# Patient Record
Sex: MALE | Race: WHITE | NOT HISPANIC OR LATINO | URBAN - METROPOLITAN AREA
[De-identification: names, ages, dates, MRNs, and addresses within clinical notes are randomized per-mention and may not be internally consistent; named-entity substitution may affect disease eponyms.]

---

## 2017-01-13 ENCOUNTER — EMERGENCY (EMERGENCY)
Facility: HOSPITAL | Age: 62
LOS: 1 days | Discharge: HOME | End: 2017-01-13
Attending: EMERGENCY MEDICINE | Admitting: EMERGENCY MEDICINE
Payer: COMMERCIAL

## 2017-01-13 VITALS
HEART RATE: 98 BPM | SYSTOLIC BLOOD PRESSURE: 165 MMHG | DIASTOLIC BLOOD PRESSURE: 93 MMHG | OXYGEN SATURATION: 98 % | RESPIRATION RATE: 18 BRPM | TEMPERATURE: 98 F

## 2017-01-13 DIAGNOSIS — S06.0X0A CONCUSSION WITHOUT LOSS OF CONSCIOUSNESS, INITIAL ENCOUNTER: ICD-10-CM

## 2017-01-13 DIAGNOSIS — S09.90XA UNSPECIFIED INJURY OF HEAD, INITIAL ENCOUNTER: ICD-10-CM
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
## 2017-01-13 LAB
ALBUMIN SERPL ELPH-MCNC: 4 G/DL — SIGNIFICANT CHANGE UP (ref 3.3–5)
ALP SERPL-CCNC: 109 U/L — SIGNIFICANT CHANGE UP (ref 40–120)
ALT FLD-CCNC: 30 U/L — SIGNIFICANT CHANGE UP (ref 12–78)
ANION GAP SERPL CALC-SCNC: 9 MMOL/L — SIGNIFICANT CHANGE UP (ref 5–17)
APTT BLD: 35.1 SEC — SIGNIFICANT CHANGE UP (ref 27.5–37.4)
AST SERPL-CCNC: 22 U/L — SIGNIFICANT CHANGE UP (ref 15–37)
BASOPHILS # BLD AUTO: 0.1 K/UL — SIGNIFICANT CHANGE UP (ref 0–0.2)
BASOPHILS NFR BLD AUTO: 1.1 % — SIGNIFICANT CHANGE UP (ref 0–2)
BILIRUB SERPL-MCNC: 0.3 MG/DL — SIGNIFICANT CHANGE UP (ref 0.2–1.2)
BLD GP AB SCN SERPL QL: SIGNIFICANT CHANGE UP
BUN SERPL-MCNC: 13 MG/DL — SIGNIFICANT CHANGE UP (ref 7–23)
CALCIUM SERPL-MCNC: 9 MG/DL — SIGNIFICANT CHANGE UP (ref 8.5–10.1)
CHLORIDE SERPL-SCNC: 106 MMOL/L — SIGNIFICANT CHANGE UP (ref 96–108)
CO2 SERPL-SCNC: 26 MMOL/L — SIGNIFICANT CHANGE UP (ref 22–31)
CREAT SERPL-MCNC: 0.97 MG/DL — SIGNIFICANT CHANGE UP (ref 0.5–1.3)
EOSINOPHIL # BLD AUTO: 0.2 K/UL — SIGNIFICANT CHANGE UP (ref 0–0.5)
EOSINOPHIL NFR BLD AUTO: 2.2 % — SIGNIFICANT CHANGE UP (ref 0–6)
GLUCOSE SERPL-MCNC: 95 MG/DL — SIGNIFICANT CHANGE UP (ref 70–99)
HCT VFR BLD CALC: 44.9 % — SIGNIFICANT CHANGE UP (ref 39–50)
HGB BLD-MCNC: 14.8 G/DL — SIGNIFICANT CHANGE UP (ref 13–17)
INR BLD: 1.03 RATIO — SIGNIFICANT CHANGE UP (ref 0.88–1.16)
LYMPHOCYTES # BLD AUTO: 38.9 % — SIGNIFICANT CHANGE UP (ref 13–44)
LYMPHOCYTES # BLD AUTO: 4.1 K/UL — HIGH (ref 1–3.3)
MCHC RBC-ENTMCNC: 29.8 PG — SIGNIFICANT CHANGE UP (ref 27–34)
MCHC RBC-ENTMCNC: 32.8 GM/DL — SIGNIFICANT CHANGE UP (ref 32–36)
MCV RBC AUTO: 90.9 FL — SIGNIFICANT CHANGE UP (ref 80–100)
MONOCYTES # BLD AUTO: 1 K/UL — HIGH (ref 0–0.9)
MONOCYTES NFR BLD AUTO: 9.9 % — HIGH (ref 1–9)
NEUTROPHILS # BLD AUTO: 5 K/UL — SIGNIFICANT CHANGE UP (ref 1.8–7.4)
NEUTROPHILS NFR BLD AUTO: 47.9 % — SIGNIFICANT CHANGE UP (ref 43–77)
PLATELET # BLD AUTO: 332 K/UL — SIGNIFICANT CHANGE UP (ref 150–400)
POTASSIUM SERPL-MCNC: 3.9 MMOL/L — SIGNIFICANT CHANGE UP (ref 3.5–5.3)
POTASSIUM SERPL-SCNC: 3.9 MMOL/L — SIGNIFICANT CHANGE UP (ref 3.5–5.3)
PROT SERPL-MCNC: 8.4 G/DL — HIGH (ref 6–8.3)
PROTHROM AB SERPL-ACNC: 11.4 SEC — SIGNIFICANT CHANGE UP (ref 10–13.1)
RBC # BLD: 4.94 M/UL — SIGNIFICANT CHANGE UP (ref 4.2–5.8)
RBC # FLD: 12.1 % — SIGNIFICANT CHANGE UP (ref 10.3–14.5)
SODIUM SERPL-SCNC: 141 MMOL/L — SIGNIFICANT CHANGE UP (ref 135–145)
WBC # BLD: 10.5 K/UL — SIGNIFICANT CHANGE UP (ref 3.8–10.5)
WBC # FLD AUTO: 10.5 K/UL — SIGNIFICANT CHANGE UP (ref 3.8–10.5)

## 2017-01-13 PROCEDURE — 71260 CT THORAX DX C+: CPT | Mod: 26

## 2017-01-13 PROCEDURE — 99285 EMERGENCY DEPT VISIT HI MDM: CPT

## 2017-01-13 PROCEDURE — 72125 CT NECK SPINE W/O DYE: CPT | Mod: 26

## 2017-01-13 PROCEDURE — 70450 CT HEAD/BRAIN W/O DYE: CPT | Mod: 26

## 2017-01-13 PROCEDURE — 74177 CT ABD & PELVIS W/CONTRAST: CPT | Mod: 26

## 2017-01-13 RX ORDER — SODIUM CHLORIDE 9 MG/ML
1000 INJECTION INTRAMUSCULAR; INTRAVENOUS; SUBCUTANEOUS ONCE
Qty: 0 | Refills: 0 | Status: COMPLETED | OUTPATIENT
Start: 2017-01-13 | End: 2017-01-13

## 2017-01-13 RX ORDER — KETOROLAC TROMETHAMINE 30 MG/ML
30 SYRINGE (ML) INJECTION ONCE
Qty: 0 | Refills: 0 | Status: DISCONTINUED | OUTPATIENT
Start: 2017-01-13 | End: 2017-01-13

## 2017-01-13 RX ADMIN — Medication 30 MILLIGRAM(S): at 20:37

## 2017-01-13 RX ADMIN — SODIUM CHLORIDE 1000 MILLILITER(S): 9 INJECTION INTRAMUSCULAR; INTRAVENOUS; SUBCUTANEOUS at 20:37

## 2017-01-13 RX ADMIN — Medication 30 MILLIGRAM(S): at 21:00

## 2017-01-13 RX ADMIN — SODIUM CHLORIDE 1000 MILLILITER(S): 9 INJECTION INTRAMUSCULAR; INTRAVENOUS; SUBCUTANEOUS at 22:29

## 2017-01-13 NOTE — ED PROVIDER NOTE - ENMT, MLM
Airway patent. Mouth with normal mucosa. Throat has no vesicles, no oropharyngeal exudates and uvula is midline.

## 2017-01-13 NOTE — ED ADULT NURSE REASSESSMENT NOTE - NS ED NURSE REASSESS COMMENT FT1
Pt given Toradol 30 mg ivp for c/o headache and soreness t his right buttock/hi area. many family members at bedside.
Pt able to ambulate to br w/o diff or c/o. Pt states his headache is better and the vertigo is almost completely gone
Pt still c/o being slightly dizzy and still having a headache. Pt given percocet 5/325 po for comfort. Family remain at bedside.
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
## 2017-01-13 NOTE — ED PROVIDER NOTE - PROGRESS NOTE DETAILS
pt reeval, c/o dizziness when sat up. c/o ache to left upper back and right buttock. The patient is up, standing, ambulating and feels improved. He is ready to go Home.

## 2017-01-14 VITALS
OXYGEN SATURATION: 99 % | TEMPERATURE: 98 F | SYSTOLIC BLOOD PRESSURE: 138 MMHG | HEART RATE: 74 BPM | DIASTOLIC BLOOD PRESSURE: 76 MMHG | RESPIRATION RATE: 18 BRPM

## 2017-01-14 PROCEDURE — 86901 BLOOD TYPING SEROLOGIC RH(D): CPT

## 2017-01-14 PROCEDURE — 72125 CT NECK SPINE W/O DYE: CPT

## 2017-01-14 PROCEDURE — 86850 RBC ANTIBODY SCREEN: CPT

## 2017-01-14 PROCEDURE — 74177 CT ABD & PELVIS W/CONTRAST: CPT

## 2017-01-14 PROCEDURE — 96374 THER/PROPH/DIAG INJ IV PUSH: CPT

## 2017-01-14 PROCEDURE — 85730 THROMBOPLASTIN TIME PARTIAL: CPT

## 2017-01-14 PROCEDURE — 86900 BLOOD TYPING SEROLOGIC ABO: CPT

## 2017-01-14 PROCEDURE — 70450 CT HEAD/BRAIN W/O DYE: CPT

## 2017-01-14 PROCEDURE — 99284 EMERGENCY DEPT VISIT MOD MDM: CPT | Mod: 25

## 2017-01-14 PROCEDURE — 71260 CT THORAX DX C+: CPT

## 2017-01-14 PROCEDURE — 80053 COMPREHEN METABOLIC PANEL: CPT

## 2017-01-14 PROCEDURE — 85610 PROTHROMBIN TIME: CPT

## 2017-01-14 PROCEDURE — 85027 COMPLETE CBC AUTOMATED: CPT

## 2017-01-19 ENCOUNTER — APPOINTMENT (OUTPATIENT)
Dept: PHYSICAL MEDICINE AND REHAB | Facility: CLINIC | Age: 62
End: 2017-01-19

## 2017-01-19 VITALS
HEIGHT: 69 IN | SYSTOLIC BLOOD PRESSURE: 137 MMHG | HEART RATE: 91 BPM | BODY MASS INDEX: 31.84 KG/M2 | WEIGHT: 215 LBS | OXYGEN SATURATION: 98 % | RESPIRATION RATE: 16 BRPM | DIASTOLIC BLOOD PRESSURE: 89 MMHG

## 2017-01-19 DIAGNOSIS — Z87.891 PERSONAL HISTORY OF NICOTINE DEPENDENCE: ICD-10-CM

## 2017-01-19 DIAGNOSIS — R42 DIZZINESS AND GIDDINESS: ICD-10-CM

## 2017-01-19 DIAGNOSIS — Z78.9 OTHER SPECIFIED HEALTH STATUS: ICD-10-CM

## 2017-01-19 DIAGNOSIS — Z82.49 FAMILY HISTORY OF ISCHEMIC HEART DISEASE AND OTHER DISEASES OF THE CIRCULATORY SYSTEM: ICD-10-CM

## 2017-01-19 DIAGNOSIS — E78.00 PURE HYPERCHOLESTEROLEMIA, UNSPECIFIED: ICD-10-CM

## 2017-01-19 RX ORDER — CLARITHROMYCIN 500 MG/1
500 TABLET, FILM COATED ORAL
Qty: 20 | Refills: 0 | Status: DISCONTINUED | COMMUNITY
Start: 2016-10-17

## 2017-01-19 RX ORDER — OXYCODONE AND ACETAMINOPHEN 5; 325 MG/1; MG/1
5-325 TABLET ORAL
Qty: 20 | Refills: 0 | Status: DISCONTINUED | COMMUNITY
Start: 2017-01-14

## 2017-02-03 ENCOUNTER — APPOINTMENT (OUTPATIENT)
Dept: PHYSICAL MEDICINE AND REHAB | Facility: CLINIC | Age: 62
End: 2017-02-03

## 2017-02-03 VITALS
HEART RATE: 103 BPM | BODY MASS INDEX: 31.84 KG/M2 | SYSTOLIC BLOOD PRESSURE: 131 MMHG | DIASTOLIC BLOOD PRESSURE: 81 MMHG | WEIGHT: 215 LBS | OXYGEN SATURATION: 97 % | HEIGHT: 69 IN | RESPIRATION RATE: 16 BRPM

## 2017-02-24 ENCOUNTER — APPOINTMENT (OUTPATIENT)
Dept: PHYSICAL MEDICINE AND REHAB | Facility: CLINIC | Age: 62
End: 2017-02-24

## 2017-02-24 VITALS
DIASTOLIC BLOOD PRESSURE: 81 MMHG | HEART RATE: 98 BPM | RESPIRATION RATE: 16 BRPM | OXYGEN SATURATION: 97 % | SYSTOLIC BLOOD PRESSURE: 130 MMHG | WEIGHT: 215 LBS | BODY MASS INDEX: 31.84 KG/M2 | HEIGHT: 69 IN

## 2017-02-24 DIAGNOSIS — T14.8 OTHER INJURY OF UNSPECIFIED BODY REGION: ICD-10-CM

## 2017-02-24 DIAGNOSIS — S06.0X0A CONCUSSION W/OUT LOSS OF CONSCIOUSNESS, INITIAL ENCOUNTER: ICD-10-CM

## 2017-02-24 DIAGNOSIS — M70.61 TROCHANTERIC BURSITIS, RIGHT HIP: ICD-10-CM

## 2017-02-25 PROBLEM — T14.8: Status: ACTIVE | Noted: 2017-01-20

## 2017-02-25 PROBLEM — M70.61 GREATER TROCHANTERIC BURSITIS OF RIGHT HIP: Status: ACTIVE | Noted: 2017-01-19

## 2017-04-24 ENCOUNTER — APPOINTMENT (OUTPATIENT)
Dept: PHYSICAL MEDICINE AND REHAB | Facility: CLINIC | Age: 62
End: 2017-04-24

## 2018-01-10 ENCOUNTER — EMERGENCY (EMERGENCY)
Facility: HOSPITAL | Age: 63
LOS: 1 days | Discharge: HOME | End: 2018-01-10
Attending: EMERGENCY MEDICINE | Admitting: EMERGENCY MEDICINE
Payer: COMMERCIAL

## 2018-01-10 VITALS
HEIGHT: 69 IN | RESPIRATION RATE: 17 BRPM | DIASTOLIC BLOOD PRESSURE: 68 MMHG | OXYGEN SATURATION: 98 % | HEART RATE: 98 BPM | WEIGHT: 214.95 LBS | SYSTOLIC BLOOD PRESSURE: 110 MMHG | TEMPERATURE: 98 F

## 2018-01-10 DIAGNOSIS — S01.81XA LACERATION WITHOUT FOREIGN BODY OF OTHER PART OF HEAD, INITIAL ENCOUNTER: ICD-10-CM

## 2018-01-10 PROCEDURE — 99283 EMERGENCY DEPT VISIT LOW MDM: CPT | Mod: 25

## 2018-01-10 PROCEDURE — 12052 INTMD RPR FACE/MM 2.6-5.0 CM: CPT

## 2018-01-10 PROCEDURE — 99285 EMERGENCY DEPT VISIT HI MDM: CPT | Mod: 25

## 2018-01-10 PROCEDURE — 12052 INTMD RPR FACE/MM 2.6-5.0 CM: CPT | Mod: 54

## 2018-01-10 NOTE — PROCEDURE NOTE - PROCEDURE
Laceration repair of face  01/10/2018  Intermediate Repair of Facial Laceration 2.8cm  Active  [id number] <<-----Click on this checkbox to enter Procedure

## 2018-01-10 NOTE — ED ADULT NURSE NOTE - OBJECTIVE STATEMENT
Received the patient in the Er. Patient is alert and oriented. Skin warm and dry. Sustained a laceration to forehead. Patient is sent by plastic surgeon.

## 2018-01-10 NOTE — ED PROVIDER NOTE - OBJECTIVE STATEMENT
61 yo male c/o vertical laceration over right side of forehead s/p having his bathroom door hit his head, no LOC, tetanus up to date, here to see Plastics Dr. Tavares from  plastic surgery. 63 yo male c/o vertical laceration over right side of forehead s/p walking into his bathroom door after showering this morning hit head against door, no LOC, tetanus up to date, here to see Plastics Dr. Tavares from  plastic surgery.

## 2018-01-10 NOTE — CONSULT NOTE ADULT - SUBJECTIVE AND OBJECTIVE BOX
History of Present Illness:  This is the case of a 63 y/o M patient without significant past medical or surgical history. He sustained trauma to his forehead this morning when he accidentally hit the corner of his bathroom door at home. He says he waited to come to ED because he was busy at work. He temporarily contained the bleeding with compression bandages. He denied any other associated symptoms. He was seen by our ED staff and plastic surgery consult was requested for evaluation and management of his facial laceration. I came in to evaluate and treat him.    Review of Systems:  Unremarkable    Medications:  None    Allergies:  NKDA    Physical Exam:  2.8cm long and vertically oriented laceration over the right forehead, just above the right medial eyebrow Laceration extends down to subcutaneous tissues level. There is visible subcutaneous fat at the bottom of the wound.    Assessment and Plan:  63 y/o M patient with a 2.8cm forehead laceration after hitting his head against his bathroom door this morning. Laceration extends down to subcutaneous tissues. Plan is to perform laceration repair. This will be dictated separately. Patient was instructed to allow the steri strips to be placed on top of the sutures to fall off on their own. He can apply Ice or take Tylenol OTC in case of discomfort. He will follow up with me in my office in 1 week for wound check or sooner than that on an as needed basis. He was turned back over to the emergency room staff for final disposition once procedure was completed.

## 2018-11-27 PROBLEM — Z97.8 PRESENCE OF OTHER SPECIFIED DEVICES: Chronic | Status: ACTIVE | Noted: 2018-01-10

## 2018-11-27 PROBLEM — J93.9 PNEUMOTHORAX, UNSPECIFIED: Chronic | Status: ACTIVE | Noted: 2018-01-10

## 2018-11-27 PROBLEM — S06.0X9A CONCUSSION WITH LOSS OF CONSCIOUSNESS OF UNSPECIFIED DURATION, INITIAL ENCOUNTER: Chronic | Status: ACTIVE | Noted: 2018-01-10

## 2018-11-29 ENCOUNTER — APPOINTMENT (OUTPATIENT)
Dept: CARDIOLOGY | Facility: CLINIC | Age: 63
End: 2018-11-29
Payer: COMMERCIAL

## 2018-11-29 ENCOUNTER — NON-APPOINTMENT (OUTPATIENT)
Age: 63
End: 2018-11-29

## 2018-11-29 VITALS
WEIGHT: 223 LBS | DIASTOLIC BLOOD PRESSURE: 88 MMHG | HEART RATE: 88 BPM | OXYGEN SATURATION: 96 % | BODY MASS INDEX: 33.03 KG/M2 | SYSTOLIC BLOOD PRESSURE: 133 MMHG | HEIGHT: 69 IN

## 2018-11-29 DIAGNOSIS — R94.31 ABNORMAL ELECTROCARDIOGRAM [ECG] [EKG]: ICD-10-CM

## 2018-11-29 PROCEDURE — 99244 OFF/OP CNSLTJ NEW/EST MOD 40: CPT

## 2018-11-29 PROCEDURE — 93000 ELECTROCARDIOGRAM COMPLETE: CPT

## 2018-11-29 RX ORDER — OMEPRAZOLE 20 MG/1
20 CAPSULE, DELAYED RELEASE ORAL
Qty: 90 | Refills: 0 | Status: DISCONTINUED | COMMUNITY
Start: 2016-11-30 | End: 2018-11-29

## 2018-11-29 RX ORDER — MECLIZINE HYDROCHLORIDE 25 MG/1
25 TABLET ORAL
Qty: 63 | Refills: 0 | Status: DISCONTINUED | COMMUNITY
Start: 2017-01-19 | End: 2018-11-29

## 2018-12-03 NOTE — DISCUSSION/SUMMARY
[With Me] : with me [FreeTextEntry1] : Mr. Burroughs is a 63 year old male with HLD, here for initial evaluation.\par \par He reports no significant cardiac complaints, and remains very active. He does have an atypical chest pain that he attributes to GERD.\par \par His physical exam is notable for a soft systolic murmur, which may be mild mitral regurgitation/prolapse.\par His blood pressure is satisfactory. His EKG is a SR with an IVCD and non-specific T wave abn.\par \par He will have his most recent blood work, including lipid panel sent to our office. He will also have his echocardiogram, carotid Doppler, and cardiac CT sent over. Depending on these results, I will likely send him up for an exercise nuclear stress test, which can happen non-urgently. His father passed away of an MI at age 62.\par \par We discussed the importance of diet, and exercise in detail. I will call him after he see the results of the scans. He knows to call if any issues or concerns.

## 2018-12-03 NOTE — ADDENDUM
[FreeTextEntry1] : I have obtained his recent echocardiogram, and he has normal LV function with mild MR as of 4/2018. His EKG demonstrates a SR without obvious ischemia. He has no significant cardiac complaints and remains very active without issue.\par At current time, there is no cardiac contraindication to proceeding with a hernia repair. Routine cardiac monitoring is recommended. He will have a routine stress test in 2019.
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
## 2018-12-03 NOTE — PHYSICAL EXAM
[General Appearance - Well Developed] : well developed [Normal Appearance] : normal appearance [Well Groomed] : well groomed [General Appearance - Well Nourished] : well nourished [No Deformities] : no deformities [General Appearance - In No Acute Distress] : no acute distress [Normal Conjunctiva] : the conjunctiva exhibited no abnormalities [Eyelids - No Xanthelasma] : the eyelids demonstrated no xanthelasmas [Normal Oral Mucosa] : normal oral mucosa [No Oral Pallor] : no oral pallor [No Oral Cyanosis] : no oral cyanosis [Normal Jugular Venous A Waves Present] : normal jugular venous A waves present [Normal Jugular Venous V Waves Present] : normal jugular venous V waves present [No Jugular Venous Sparks A Waves] : no jugular venous sparks A waves [Normal Rate] : normal [Normal S1] : normal S1 [Normal S2] : normal S2 [I] : a grade 1 [II] : a grade 2 [2+] : left 2+ [No Abnormalities] : the abdominal aorta was not enlarged and no bruit was heard [No Pitting Edema] : no pitting edema present [Respiration, Rhythm And Depth] : normal respiratory rhythm and effort [Exaggerated Use Of Accessory Muscles For Inspiration] : no accessory muscle use [Auscultation Breath Sounds / Voice Sounds] : lungs were clear to auscultation bilaterally [Abdomen Soft] : soft [Abdomen Tenderness] : non-tender [Abdomen Mass (___ Cm)] : no abdominal mass palpated [Abnormal Walk] : normal gait [Gait - Sufficient For Exercise Testing] : the gait was sufficient for exercise testing [Nail Clubbing] : no clubbing of the fingernails [Cyanosis, Localized] : no localized cyanosis [Petechial Hemorrhages (___cm)] : no petechial hemorrhages [Skin Color & Pigmentation] : normal skin color and pigmentation [] : no rash [No Venous Stasis] : no venous stasis [Skin Lesions] : no skin lesions [No Skin Ulcers] : no skin ulcer [No Xanthoma] : no  xanthoma was observed [Oriented To Time, Place, And Person] : oriented to person, place, and time [Affect] : the affect was normal [Mood] : the mood was normal [No Anxiety] : not feeling anxious [S3] : no S3 [S4] : no S4 [Right Carotid Bruit] : no bruit heard over the right carotid [Left Carotid Bruit] : no bruit heard over the left carotid [Right Femoral Bruit] : no bruit heard over the right femoral artery [Left Femoral Bruit] : no bruit heard over the left femoral artery

## 2019-05-08 NOTE — CONSULT NOTE ADULT - ASSESSMENT
May 16, 2019        Eduardo Mon  [address]  [facility] [address]      Dear Eduardo Mon,    We have tried to contact you by phone but have not been successful. Voice mailbox is full so we could not leave a message with results and Follow up instructions. A letter was sent requesting you to call us for lab results but we have not  [place] back from you.    The following lab values have been returned and is as follows:                                                                                                Triglycerides:  NORMAL  Goal:  Under 200                    TRIGLYCERIDE (mg/dL)   Date Value   05/07/2019 49       Cholesterol (Total):  NORMAL        Goal:  Under 200                                                                              CHOLESTEROL (mg/dL)   Date Value   05/07/2019 175                                 HDL (good):  NORMAL                 Goal:  Over 40        HDL (mg/dL)   Date Value   05/07/2019 70           LDL (bad):  NORMAL      Goal:  Under 130    LDL (mg/dl)  Date                             Value          5/3/2019                        95        Blood Sugar:  NORMAL                Goal:  Under 99    Glucose mg/dl  Date                                  Value  5/3/2019                             91    Eduardo Mon     Page 2      The following test results have returned normal:     Kidney except for BUN, Potassium, Urinalysis, Liver except for total bilirubin level and PSA (Prostate)      Comments on Results:  Total bilirubin and BUN was elevated      Instructions:  Per Dr. Chandra your liver function lab needs to be repeated in 1-2 weeks   non-fasting, after a good portioned meal and fully hydrated. Order has been placed.please call to schedule appointment here at the clinic or you can go to any ACL lab site closer to home.      Sincerely,        Nolberto Chandra MD/Laurie KAPLAN, LPN  [address]  [address]  [facility] [address]  [phone]         As detailed above

## 2021-04-15 ENCOUNTER — NON-APPOINTMENT (OUTPATIENT)
Age: 66
End: 2021-04-15

## 2021-04-15 ENCOUNTER — APPOINTMENT (OUTPATIENT)
Dept: CARDIOLOGY | Facility: CLINIC | Age: 66
End: 2021-04-15
Payer: COMMERCIAL

## 2021-04-15 VITALS
OXYGEN SATURATION: 98 % | WEIGHT: 219 LBS | DIASTOLIC BLOOD PRESSURE: 87 MMHG | HEART RATE: 84 BPM | HEIGHT: 69 IN | SYSTOLIC BLOOD PRESSURE: 157 MMHG | BODY MASS INDEX: 32.44 KG/M2

## 2021-04-15 PROCEDURE — 93000 ELECTROCARDIOGRAM COMPLETE: CPT

## 2021-04-15 PROCEDURE — 99072 ADDL SUPL MATRL&STAF TM PHE: CPT

## 2021-04-15 PROCEDURE — 99214 OFFICE O/P EST MOD 30 MIN: CPT

## 2021-04-15 RX ORDER — RANITIDINE 150 MG/1
150 TABLET ORAL
Qty: 90 | Refills: 3 | Status: DISCONTINUED | COMMUNITY
Start: 2018-11-29 | End: 2021-04-15

## 2021-04-15 RX ORDER — FAMOTIDINE 20 MG/1
20 TABLET, FILM COATED ORAL
Refills: 0 | Status: ACTIVE | COMMUNITY

## 2021-04-15 NOTE — DISCUSSION/SUMMARY
[With Me] : with me [FreeTextEntry1] : Mr. Burroughs is a 65 year old male with HLD, here for follow up.\par \par He reports some dyspnea on exertion and intermittent chest pain. His EKG is a SR with an IVCD and non-specific ST and T wave abnormality.\par \par In the setting of his symptoms, and abnormal EKG, he will be set up for exercise nuclear stress test in our office.  If he is unable to exercise on a treadmill because of orthopedic issues, the test will need to be pharmacologic.\par \par We discussed the importance of diet, and exercise in detail. He will remain on ASA. We will speak after the test and arrange follow up.
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
## 2021-04-15 NOTE — PHYSICAL EXAM
[General Appearance - Well Developed] : well developed [Normal Appearance] : normal appearance [Well Groomed] : well groomed [General Appearance - Well Nourished] : well nourished [No Deformities] : no deformities [General Appearance - In No Acute Distress] : no acute distress [Normal Conjunctiva] : the conjunctiva exhibited no abnormalities [Eyelids - No Xanthelasma] : the eyelids demonstrated no xanthelasmas [Normal Oral Mucosa] : normal oral mucosa [No Oral Pallor] : no oral pallor [No Oral Cyanosis] : no oral cyanosis [Normal Jugular Venous A Waves Present] : normal jugular venous A waves present [Normal Jugular Venous V Waves Present] : normal jugular venous V waves present [No Jugular Venous Sparks A Waves] : no jugular venous sparks A waves [Respiration, Rhythm And Depth] : normal respiratory rhythm and effort [Exaggerated Use Of Accessory Muscles For Inspiration] : no accessory muscle use [Auscultation Breath Sounds / Voice Sounds] : lungs were clear to auscultation bilaterally [Abdomen Soft] : soft [Abdomen Tenderness] : non-tender [Abdomen Mass (___ Cm)] : no abdominal mass palpated [Abnormal Walk] : normal gait [Gait - Sufficient For Exercise Testing] : the gait was sufficient for exercise testing [Nail Clubbing] : no clubbing of the fingernails [Cyanosis, Localized] : no localized cyanosis [Petechial Hemorrhages (___cm)] : no petechial hemorrhages [Skin Color & Pigmentation] : normal skin color and pigmentation [] : no rash [No Venous Stasis] : no venous stasis [Skin Lesions] : no skin lesions [No Skin Ulcers] : no skin ulcer [No Xanthoma] : no  xanthoma was observed [Oriented To Time, Place, And Person] : oriented to person, place, and time [Affect] : the affect was normal [Mood] : the mood was normal [No Anxiety] : not feeling anxious [Normal Rate] : normal [Normal S1] : normal S1 [Normal S2] : normal S2 [S3] : no S3 [S4] : no S4 [I] : a grade 1 [II] : a grade 2 [Right Carotid Bruit] : no bruit heard over the right carotid [Left Carotid Bruit] : no bruit heard over the left carotid [Right Femoral Bruit] : no bruit heard over the right femoral artery [Left Femoral Bruit] : no bruit heard over the left femoral artery [2+] : left 2+ [No Abnormalities] : the abdominal aorta was not enlarged and no bruit was heard [No Pitting Edema] : no pitting edema present

## 2021-04-23 ENCOUNTER — NON-APPOINTMENT (OUTPATIENT)
Age: 66
End: 2021-04-23

## 2021-04-26 ENCOUNTER — APPOINTMENT (OUTPATIENT)
Dept: CARDIOLOGY | Facility: CLINIC | Age: 66
End: 2021-04-26
Payer: COMMERCIAL

## 2021-04-26 PROCEDURE — 78452 HT MUSCLE IMAGE SPECT MULT: CPT

## 2021-04-26 PROCEDURE — 99072 ADDL SUPL MATRL&STAF TM PHE: CPT

## 2021-04-26 PROCEDURE — A9500: CPT

## 2021-04-26 PROCEDURE — 93015 CV STRESS TEST SUPVJ I&R: CPT

## 2021-05-03 ENCOUNTER — APPOINTMENT (OUTPATIENT)
Dept: CARDIOLOGY | Facility: CLINIC | Age: 66
End: 2021-05-03
Payer: COMMERCIAL

## 2021-05-03 ENCOUNTER — FORM ENCOUNTER (OUTPATIENT)
Age: 66
End: 2021-05-03

## 2021-05-03 PROCEDURE — 99213 OFFICE O/P EST LOW 20 MIN: CPT | Mod: 95

## 2021-05-03 NOTE — DISCUSSION/SUMMARY
[With Me] : with me [FreeTextEntry1] : Mr. Burroughs is a 65 year old male with HLD, here for follow up.\par \par He recently had reported both chest pain and some dyspnea on exertion.  A pharmacologic stress test demonstrated severe inferior and lateral wall defects, with some reversibility, and a decline in LV function. Given this finding, and his symptoms, he will be set up for a cardiac catheterization.  He has the possibility of a contrast reaction, and will be pretreated accordingly.\par We discussed the importance of diet, and exercise in detail. He will remain on ASA. \par We will speak after the procedure, and arrange follow-up.
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
## 2021-05-03 NOTE — PHYSICAL EXAM
[Normal] : well developed, well nourished, no acute distress [de-identified] : telehealth visit so exam deferred

## 2021-05-06 ENCOUNTER — NON-APPOINTMENT (OUTPATIENT)
Age: 66
End: 2021-05-06

## 2021-05-06 ENCOUNTER — APPOINTMENT (OUTPATIENT)
Dept: INTERNAL MEDICINE | Facility: CLINIC | Age: 66
End: 2021-05-06
Payer: COMMERCIAL

## 2021-05-06 VITALS
HEIGHT: 69 IN | OXYGEN SATURATION: 96 % | SYSTOLIC BLOOD PRESSURE: 126 MMHG | DIASTOLIC BLOOD PRESSURE: 74 MMHG | RESPIRATION RATE: 12 BRPM | TEMPERATURE: 99.9 F | WEIGHT: 209 LBS | HEART RATE: 110 BPM | BODY MASS INDEX: 30.96 KG/M2

## 2021-05-06 DIAGNOSIS — J06.9 ACUTE UPPER RESPIRATORY INFECTION, UNSPECIFIED: ICD-10-CM

## 2021-05-06 LAB — S PYO AG SPEC QL IA: NEGATIVE

## 2021-05-06 PROCEDURE — 87880 STREP A ASSAY W/OPTIC: CPT | Mod: QW

## 2021-05-06 PROCEDURE — 99214 OFFICE O/P EST MOD 30 MIN: CPT | Mod: 25

## 2021-05-06 PROCEDURE — 99072 ADDL SUPL MATRL&STAF TM PHE: CPT

## 2021-05-07 ENCOUNTER — APPOINTMENT (OUTPATIENT)
Dept: DISASTER EMERGENCY | Facility: CLINIC | Age: 66
End: 2021-05-07

## 2021-05-08 LAB — SARS-COV-2 N GENE NPH QL NAA+PROBE: NOT DETECTED

## 2021-05-12 ENCOUNTER — OUTPATIENT (OUTPATIENT)
Dept: OUTPATIENT SERVICES | Facility: HOSPITAL | Age: 66
LOS: 1 days | End: 2021-05-12
Payer: COMMERCIAL

## 2021-05-12 DIAGNOSIS — Z11.52 ENCOUNTER FOR SCREENING FOR COVID-19: ICD-10-CM

## 2021-05-12 LAB — SARS-COV-2 RNA SPEC QL NAA+PROBE: SIGNIFICANT CHANGE UP

## 2021-05-12 PROCEDURE — U0005: CPT

## 2021-05-12 PROCEDURE — U0003: CPT

## 2021-05-12 PROCEDURE — C9803: CPT

## 2021-05-13 ENCOUNTER — OUTPATIENT (OUTPATIENT)
Dept: OUTPATIENT SERVICES | Facility: HOSPITAL | Age: 66
LOS: 1 days | End: 2021-05-13
Payer: COMMERCIAL

## 2021-05-13 VITALS
DIASTOLIC BLOOD PRESSURE: 77 MMHG | RESPIRATION RATE: 18 BRPM | OXYGEN SATURATION: 97 % | SYSTOLIC BLOOD PRESSURE: 135 MMHG | HEART RATE: 77 BPM

## 2021-05-13 VITALS
TEMPERATURE: 98 F | SYSTOLIC BLOOD PRESSURE: 131 MMHG | DIASTOLIC BLOOD PRESSURE: 75 MMHG | OXYGEN SATURATION: 98 % | WEIGHT: 212.08 LBS | RESPIRATION RATE: 18 BRPM | HEIGHT: 70 IN | HEART RATE: 73 BPM

## 2021-05-13 DIAGNOSIS — R07.9 CHEST PAIN, UNSPECIFIED: ICD-10-CM

## 2021-05-13 LAB
ANION GAP SERPL CALC-SCNC: 14 MMOL/L — SIGNIFICANT CHANGE UP (ref 5–17)
BUN SERPL-MCNC: 13 MG/DL — SIGNIFICANT CHANGE UP (ref 7–23)
CALCIUM SERPL-MCNC: 9.8 MG/DL — SIGNIFICANT CHANGE UP (ref 8.4–10.5)
CHLORIDE SERPL-SCNC: 107 MMOL/L — SIGNIFICANT CHANGE UP (ref 96–108)
CO2 SERPL-SCNC: 19 MMOL/L — LOW (ref 22–31)
CREAT SERPL-MCNC: 0.84 MG/DL — SIGNIFICANT CHANGE UP (ref 0.5–1.3)
GLUCOSE SERPL-MCNC: 88 MG/DL — SIGNIFICANT CHANGE UP (ref 70–99)
HCT VFR BLD CALC: 41.7 % — SIGNIFICANT CHANGE UP (ref 39–50)
HGB BLD-MCNC: 13.8 G/DL — SIGNIFICANT CHANGE UP (ref 13–17)
MCHC RBC-ENTMCNC: 30.1 PG — SIGNIFICANT CHANGE UP (ref 27–34)
MCHC RBC-ENTMCNC: 33.1 GM/DL — SIGNIFICANT CHANGE UP (ref 32–36)
MCV RBC AUTO: 90.8 FL — SIGNIFICANT CHANGE UP (ref 80–100)
NRBC # BLD: 0 /100 WBCS — SIGNIFICANT CHANGE UP (ref 0–0)
PLATELET # BLD AUTO: 317 K/UL — SIGNIFICANT CHANGE UP (ref 150–400)
POTASSIUM SERPL-MCNC: 4.4 MMOL/L — SIGNIFICANT CHANGE UP (ref 3.5–5.3)
POTASSIUM SERPL-SCNC: 4.4 MMOL/L — SIGNIFICANT CHANGE UP (ref 3.5–5.3)
RBC # BLD: 4.59 M/UL — SIGNIFICANT CHANGE UP (ref 4.2–5.8)
RBC # FLD: 12.6 % — SIGNIFICANT CHANGE UP (ref 10.3–14.5)
SODIUM SERPL-SCNC: 140 MMOL/L — SIGNIFICANT CHANGE UP (ref 135–145)
WBC # BLD: 12.47 K/UL — HIGH (ref 3.8–10.5)
WBC # FLD AUTO: 12.47 K/UL — HIGH (ref 3.8–10.5)

## 2021-05-13 PROCEDURE — 99152 MOD SED SAME PHYS/QHP 5/>YRS: CPT

## 2021-05-13 PROCEDURE — C1894: CPT

## 2021-05-13 PROCEDURE — 93454 CORONARY ARTERY ANGIO S&I: CPT | Mod: 26

## 2021-05-13 PROCEDURE — 80048 BASIC METABOLIC PNL TOTAL CA: CPT

## 2021-05-13 PROCEDURE — 93010 ELECTROCARDIOGRAM REPORT: CPT

## 2021-05-13 PROCEDURE — 93005 ELECTROCARDIOGRAM TRACING: CPT

## 2021-05-13 PROCEDURE — 93454 CORONARY ARTERY ANGIO S&I: CPT

## 2021-05-13 PROCEDURE — 85027 COMPLETE CBC AUTOMATED: CPT

## 2021-05-13 PROCEDURE — C1769: CPT

## 2021-05-13 PROCEDURE — C1887: CPT

## 2021-05-13 RX ORDER — ATORVASTATIN CALCIUM 80 MG/1
1 TABLET, FILM COATED ORAL
Qty: 30 | Refills: 0
Start: 2021-05-13 | End: 2021-06-11

## 2021-05-13 NOTE — ASU DISCHARGE PLAN (ADULT/PEDIATRIC) - CARE PROVIDER_API CALL
Familia Tineo)  Cardiovascular Disease; Internal Medicine  [address], [place] [id number]  Phone: [phone]  Fax: [phone]  Follow Up Time:
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
## 2021-05-13 NOTE — H&P CARDIOLOGY - PMH
Chest tube in place    Concussion    GERD (gastroesophageal reflux disease)    HLD (hyperlipidemia)    Pneumothorax

## 2021-05-13 NOTE — ASU DISCHARGE PLAN (ADULT/PEDIATRIC) - ASU DC SPECIAL INSTRUCTIONSFT
Wound Care:   the day AFTER your procedure remove bandage GENTLY, and clean using  mild soap and gentle warm, water stream, pat dry. leave OPEN to air. YOU MAY SHOWER   DO NOT apply lotions, creams, ointments, powder, perfumes to your incision site  DO NOT SOAK your site for 1 week ( no baths, no pools, no tubs, etc...)  Check  your groin and /or wrist daily. A small amount of bruising, and soreness are normal    ACTIVITY: for 24 hours   - DO NOT DRIVE  - DO NOT make any important decisions or sign legal documents   - DO NOT operate heavy machineries   - you may resume sexual activity in 48 hours, unless otherwise instructed by your cardiologist     If your procedure was done through the WRIST: for the NEXT 3DAYS:  - avoid pushing, pulling, with that affected wrist   - avoid repeated movement of that hand and wrist ( e.g: typing, hammering)  - DO NOT LIFT anything more than 5 lbs     If your procedure was done through the GROIN: for the NEXT 5 DAYS  - Limit climbing stairs, DO NOT soak in bathtub or pool  - no strenuous activities, pushing, pulling, straining  - Do not lift anything 10lbs or heavier     MEDICATION:   take your medications as explained ( see discharge paperwork)   If you received a STENT, you will be taking antiplatelet medications to KEEP YOUR STENT OPEN ( e.g: Aspirin, Plavix, Brilinta, Effient, etc).  Take as prescribed DO NOT STOP taking them without consulting with your cardiologist first.     Follow heart healthy diet recommended by your doctor, , if you smoke STOP SMOKING ( may call [phone] for center of tobacco control if you need assistance)     CALL your doctor to make appointment in 2 WEEKS     ***CALL YOUR DOCTOR***  if you experience: fever, chills, body aches, or severe pain, swelling, redness, heat or yellow discharge at incision site  If you experience Bleeding or excruciating pain at the procedural site, swelling ( golf ball size) at your procedural site  If you experience CHEST PAIN  If you experience extremity numbness, tingling, temperature change ( of your procedural site)   If you are unable to reach your doctor, you may contact:   -Cardiology Office at [facility] at [phone] or   - [facility] [phone]  - [facility] [phone]    Follow with Dr Tineo as outpatient for PCI/CABG.  used

## 2021-05-17 ENCOUNTER — OUTPATIENT (OUTPATIENT)
Dept: OUTPATIENT SERVICES | Facility: HOSPITAL | Age: 66
LOS: 1 days | End: 2021-05-17
Payer: COMMERCIAL

## 2021-05-17 DIAGNOSIS — Z11.52 ENCOUNTER FOR SCREENING FOR COVID-19: ICD-10-CM

## 2021-05-17 PROBLEM — E78.5 HYPERLIPIDEMIA, UNSPECIFIED: Chronic | Status: ACTIVE | Noted: 2021-05-13

## 2021-05-17 PROBLEM — K21.9 GASTRO-ESOPHAGEAL REFLUX DISEASE WITHOUT ESOPHAGITIS: Chronic | Status: ACTIVE | Noted: 2021-05-13

## 2021-05-17 LAB — SARS-COV-2 RNA SPEC QL NAA+PROBE: SIGNIFICANT CHANGE UP

## 2021-05-17 PROCEDURE — C9803: CPT

## 2021-05-17 PROCEDURE — U0005: CPT

## 2021-05-17 PROCEDURE — U0003: CPT

## 2021-05-19 ENCOUNTER — OUTPATIENT (OUTPATIENT)
Dept: OUTPATIENT SERVICES | Facility: HOSPITAL | Age: 66
LOS: 1 days | Discharge: HOME | End: 2021-05-19
Payer: COMMERCIAL

## 2021-05-19 VITALS
RESPIRATION RATE: 18 BRPM | SYSTOLIC BLOOD PRESSURE: 125 MMHG | HEART RATE: 80 BPM | DIASTOLIC BLOOD PRESSURE: 80 MMHG | OXYGEN SATURATION: 96 %

## 2021-05-19 VITALS
SYSTOLIC BLOOD PRESSURE: 136 MMHG | HEART RATE: 77 BPM | HEIGHT: 69 IN | RESPIRATION RATE: 16 BRPM | DIASTOLIC BLOOD PRESSURE: 86 MMHG | OXYGEN SATURATION: 97 % | WEIGHT: 207.9 LBS | TEMPERATURE: 98 F

## 2021-05-19 DIAGNOSIS — R94.39 ABNORMAL RESULT OF OTHER CARDIOVASCULAR FUNCTION STUDY: ICD-10-CM

## 2021-05-19 DIAGNOSIS — R07.9 CHEST PAIN, UNSPECIFIED: ICD-10-CM

## 2021-05-19 LAB
ANION GAP SERPL CALC-SCNC: 14 MMOL/L — SIGNIFICANT CHANGE UP (ref 5–17)
BUN SERPL-MCNC: 20 MG/DL — SIGNIFICANT CHANGE UP (ref 7–23)
CALCIUM SERPL-MCNC: 9.3 MG/DL — SIGNIFICANT CHANGE UP (ref 8.4–10.5)
CHLORIDE SERPL-SCNC: 105 MMOL/L — SIGNIFICANT CHANGE UP (ref 96–108)
CO2 SERPL-SCNC: 20 MMOL/L — LOW (ref 22–31)
CREAT SERPL-MCNC: 0.82 MG/DL — SIGNIFICANT CHANGE UP (ref 0.5–1.3)
GLUCOSE SERPL-MCNC: 97 MG/DL — SIGNIFICANT CHANGE UP (ref 70–99)
HCT VFR BLD CALC: 43.1 % — SIGNIFICANT CHANGE UP (ref 39–50)
HGB BLD-MCNC: 14.3 G/DL — SIGNIFICANT CHANGE UP (ref 13–17)
MCHC RBC-ENTMCNC: 30.3 PG — SIGNIFICANT CHANGE UP (ref 27–34)
MCHC RBC-ENTMCNC: 33.2 GM/DL — SIGNIFICANT CHANGE UP (ref 32–36)
MCV RBC AUTO: 91.3 FL — SIGNIFICANT CHANGE UP (ref 80–100)
NRBC # BLD: 0 /100 WBCS — SIGNIFICANT CHANGE UP (ref 0–0)
PLATELET # BLD AUTO: 291 K/UL — SIGNIFICANT CHANGE UP (ref 150–400)
POTASSIUM SERPL-MCNC: 4.3 MMOL/L — SIGNIFICANT CHANGE UP (ref 3.5–5.3)
POTASSIUM SERPL-SCNC: 4.3 MMOL/L — SIGNIFICANT CHANGE UP (ref 3.5–5.3)
RBC # BLD: 4.72 M/UL — SIGNIFICANT CHANGE UP (ref 4.2–5.8)
RBC # FLD: 12.8 % — SIGNIFICANT CHANGE UP (ref 10.3–14.5)
SODIUM SERPL-SCNC: 139 MMOL/L — SIGNIFICANT CHANGE UP (ref 135–145)
WBC # BLD: 9.61 K/UL — SIGNIFICANT CHANGE UP (ref 3.8–10.5)
WBC # FLD AUTO: 9.61 K/UL — SIGNIFICANT CHANGE UP (ref 3.8–10.5)

## 2021-05-19 PROCEDURE — C1769: CPT

## 2021-05-19 PROCEDURE — 93010 ELECTROCARDIOGRAM REPORT: CPT

## 2021-05-19 PROCEDURE — 93005 ELECTROCARDIOGRAM TRACING: CPT

## 2021-05-19 PROCEDURE — 80048 BASIC METABOLIC PNL TOTAL CA: CPT

## 2021-05-19 PROCEDURE — C1753: CPT

## 2021-05-19 PROCEDURE — 92978 ENDOLUMINL IVUS OCT C 1ST: CPT | Mod: LD

## 2021-05-19 PROCEDURE — 85027 COMPLETE CBC AUTOMATED: CPT

## 2021-05-19 PROCEDURE — 93010 ELECTROCARDIOGRAM REPORT: CPT | Mod: 77

## 2021-05-19 PROCEDURE — C1887: CPT

## 2021-05-19 PROCEDURE — 99152 MOD SED SAME PHYS/QHP 5/>YRS: CPT

## 2021-05-19 PROCEDURE — 92978 ENDOLUMINL IVUS OCT C 1ST: CPT | Mod: 26,LD

## 2021-05-19 PROCEDURE — C9600: CPT | Mod: LD

## 2021-05-19 PROCEDURE — C1874: CPT

## 2021-05-19 PROCEDURE — 92928 PRQ TCAT PLMT NTRAC ST 1 LES: CPT | Mod: LD

## 2021-05-19 PROCEDURE — 99153 MOD SED SAME PHYS/QHP EA: CPT

## 2021-05-19 RX ORDER — CLOPIDOGREL BISULFATE 75 MG/1
75 TABLET, FILM COATED ORAL DAILY
Refills: 0 | Status: DISCONTINUED | OUTPATIENT
Start: 2021-05-20 | End: 2021-06-02

## 2021-05-19 RX ORDER — CLOPIDOGREL BISULFATE 75 MG/1
1 TABLET, FILM COATED ORAL
Qty: 90 | Refills: 3
Start: 2021-05-19 | End: 2022-05-13

## 2021-05-19 RX ORDER — SODIUM CHLORIDE 9 MG/ML
3 INJECTION INTRAMUSCULAR; INTRAVENOUS; SUBCUTANEOUS EVERY 8 HOURS
Refills: 0 | Status: DISCONTINUED | OUTPATIENT
Start: 2021-05-19 | End: 2021-06-02

## 2021-05-19 RX ORDER — ASPIRIN/CALCIUM CARB/MAGNESIUM 324 MG
81 TABLET ORAL DAILY
Refills: 0 | Status: DISCONTINUED | OUTPATIENT
Start: 2021-05-20 | End: 2021-06-02

## 2021-05-19 RX ADMIN — SODIUM CHLORIDE 3 MILLILITER(S): 9 INJECTION INTRAMUSCULAR; INTRAVENOUS; SUBCUTANEOUS at 14:00
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
## 2021-05-19 NOTE — PHARMACOTHERAPY INTERVENTION NOTE - COMMENTS
Counseled patient on the following discharge medications, indication, and possible side effects:    Home Medications:  Aspirin Enteric Coated 81 mg oral delayed release tablet: 1 tab(s) orally once a day  Atorvastatin 40mg tab: 1 tab orally once a day  Clopidogrel 75 mg tablet: 1 tab orally once a day      Provided Med Essential Fact Sheet in English. Patient questions and concerns were answered and addressed. Patient demonstrated understanding.    Renny Scott, Pharm D.

## 2021-05-19 NOTE — ASU DISCHARGE PLAN (ADULT/PEDIATRIC) - PROVIDER TOKENS
PROVIDER:[TOKEN:[[id number]]],PROVIDER:[TOKEN:[[id number]]] PROVIDER:[TOKEN:[[id number]]],PROVIDER:[TOKEN:[[id number]]],PROVIDER:[TOKEN:[[id number]]]

## 2021-05-19 NOTE — ASU DISCHARGE PLAN (ADULT/PEDIATRIC) - CARE PROVIDER_API CALL
Familia Tineo)  Cardiovascular Disease; Internal Medicine  [address], [place] [id number]  Phone: [phone]  Fax: [phone]  Follow Up Time:     Vikas Thakkar)  [facility] [place] [address]  Phone: [phone]  Fax: [phone]  Follow Up Time:    Familia Tineo)  Cardiovascular Disease; Internal Medicine  [address] [place], [place] [id number]  Phone: [phone]  Fax: [phone]  Follow Up Time:     Vikas Thakkar)  [place] [address]  Phone: [phone]  Fax: [phone]  Follow Up Time:     Jason Miller)  Cardiology; Internal Medicine; Interventional Cardiology  [facility]- Dept of Cardiology, [address] [place] [address]  Phone: [phone]  Fax: [phone]  Follow Up Time:

## 2021-05-19 NOTE — ASU DISCHARGE PLAN (ADULT/PEDIATRIC) - ASU DC SPECIAL INSTRUCTIONSFT
No heavy lifting for 2 weeks, no strenuous activity  ( pushing/ pulling) no driving for x 2 days,  you may shower 24 hours following procedure but no bathing or swimming for x1  week, no strenuous sex for x 1 week & follow up with your cardiologist in 1-2 week No heavy lifting for 2 weeks, no strenuous activity  ( pushing/ pulling) no driving for x 2 days,  you may shower 24 hours following procedure but no bathing or swimming for x1  week, no strenuous sex for x 1 week & follow up with your cardiologist in 1-2 week    Follow up with Dr. Miller for Staged  of RCA in 4-6 weeks

## 2021-05-27 ENCOUNTER — NON-APPOINTMENT (OUTPATIENT)
Age: 66
End: 2021-05-27

## 2021-05-27 ENCOUNTER — APPOINTMENT (OUTPATIENT)
Dept: INTERNAL MEDICINE | Facility: CLINIC | Age: 66
End: 2021-05-27
Payer: COMMERCIAL

## 2021-05-27 VITALS
DIASTOLIC BLOOD PRESSURE: 74 MMHG | WEIGHT: 208 LBS | BODY MASS INDEX: 30.81 KG/M2 | SYSTOLIC BLOOD PRESSURE: 113 MMHG | OXYGEN SATURATION: 98 % | HEART RATE: 86 BPM | HEIGHT: 69 IN | RESPIRATION RATE: 12 BRPM

## 2021-05-27 VITALS — DIASTOLIC BLOOD PRESSURE: 80 MMHG | SYSTOLIC BLOOD PRESSURE: 120 MMHG

## 2021-05-27 DIAGNOSIS — Z86.79 PERSONAL HISTORY OF OTHER DISEASES OF THE CIRCULATORY SYSTEM: ICD-10-CM

## 2021-05-27 PROCEDURE — 93000 ELECTROCARDIOGRAM COMPLETE: CPT | Mod: 59

## 2021-05-27 PROCEDURE — 99396 PREV VISIT EST AGE 40-64: CPT | Mod: 25

## 2021-05-27 PROCEDURE — 99213 OFFICE O/P EST LOW 20 MIN: CPT | Mod: 25

## 2021-05-27 PROCEDURE — 36415 COLL VENOUS BLD VENIPUNCTURE: CPT

## 2021-05-27 PROCEDURE — G0444 DEPRESSION SCREEN ANNUAL: CPT | Mod: NC

## 2021-05-27 PROCEDURE — 99072 ADDL SUPL MATRL&STAF TM PHE: CPT

## 2021-05-27 PROCEDURE — 99386 PREV VISIT NEW AGE 40-64: CPT | Mod: 25

## 2021-05-27 RX ORDER — ASPIRIN ENTERIC COATED TABLETS 81 MG 81 MG/1
81 TABLET, DELAYED RELEASE ORAL
Refills: 0 | Status: DISCONTINUED | COMMUNITY
End: 2021-05-27

## 2021-05-27 RX ORDER — DIPHENHYDRAMINE HYDROCHLORIDE 50 MG/ML
50 INJECTION, SOLUTION INTRAMUSCULAR; INTRAVENOUS ONCE
Qty: 50 | Refills: 0 | Status: DISCONTINUED | COMMUNITY
Start: 2021-05-03 | End: 2021-05-27

## 2021-05-27 RX ORDER — PREDNISONE 50 MG/1
50 TABLET ORAL
Qty: 3 | Refills: 0 | Status: DISCONTINUED | COMMUNITY
Start: 2021-05-03 | End: 2021-05-27

## 2021-05-27 NOTE — HEALTH RISK ASSESSMENT
[0] : 2) Feeling down, depressed, or hopeless: Not at all (0) [[id number]] : 0 [Patient reported colonoscopy was normal] : Patient reported colonoscopy was normal [ColonoscopyDate] : 2021

## 2021-05-27 NOTE — HISTORY OF PRESENT ILLNESS
[de-identified] : PATIENT RECENTLY DIAGNOSED W/ CAD AFTER ABNORMAL STRESS TEST , S/P STENT X 1 , FEELS WELL, WAS STARTED ON PLAVIX AND LIPITOR .

## 2021-05-27 NOTE — PHYSICAL EXAM
[No Acute Distress] : no acute distress [PERRL] : pupils equal round and reactive to light [EOMI] : extraocular movements intact [No Lymphadenopathy] : no lymphadenopathy [Thyroid Normal, No Nodules] : the thyroid was normal and there were no nodules present [No Carotid Bruits] : no carotid bruits [No Abdominal Bruit] : a ~M bruit was not heard ~T in the abdomen [No Edema] : there was no peripheral edema [Normal Appearance] : normal in appearance [No Masses] : no palpable masses [Prostate Enlargement] : the prostate was not enlarged [Prostate Tenderness] : the prostate was not tender [No Prostate Nodules] : no prostate nodules [Normal] : no posterior cervical lymphadenopathy and no anterior cervical lymphadenopathy [No Focal Deficits] : no focal deficits [Normal Insight/Judgement] : insight and judgment were intact [Stool Occult Blood] : stool negative for occult blood

## 2021-06-01 LAB
ALBUMIN SERPL ELPH-MCNC: 4.2 G/DL
ALP BLD-CCNC: 120 U/L
ALT SERPL-CCNC: 19 U/L
ANION GAP SERPL CALC-SCNC: 13 MMOL/L
APPEARANCE: CLEAR
AST SERPL-CCNC: 18 U/L
BACTERIA: NEGATIVE
BASOPHILS # BLD AUTO: 0.07 K/UL
BASOPHILS NFR BLD AUTO: 1 %
BILIRUB SERPL-MCNC: 0.4 MG/DL
BILIRUBIN URINE: NEGATIVE
BLOOD URINE: NEGATIVE
BUN SERPL-MCNC: 13 MG/DL
CALCIUM SERPL-MCNC: 9.4 MG/DL
CHLORIDE SERPL-SCNC: 104 MMOL/L
CHOLEST SERPL-MCNC: 158 MG/DL
CO2 SERPL-SCNC: 22 MMOL/L
COLOR: YELLOW
CREAT SERPL-MCNC: 0.88 MG/DL
EOSINOPHIL # BLD AUTO: 0.2 K/UL
EOSINOPHIL NFR BLD AUTO: 2.8 %
ESTIMATED AVERAGE GLUCOSE: 117 MG/DL
FERRITIN SERPL-MCNC: 79 NG/ML
GLUCOSE QUALITATIVE U: NEGATIVE
GLUCOSE SERPL-MCNC: 88 MG/DL
HBA1C MFR BLD HPLC: 5.7 %
HCT VFR BLD CALC: 44.6 %
HDLC SERPL-MCNC: 35 MG/DL
HGB BLD-MCNC: 14 G/DL
HYALINE CASTS: 0 /LPF
IMM GRANULOCYTES NFR BLD AUTO: 0.3 %
IRON SATN MFR SERPL: 25 %
IRON SERPL-MCNC: 73 UG/DL
KETONES URINE: NEGATIVE
LDLC SERPL CALC-MCNC: 95 MG/DL
LEUKOCYTE ESTERASE URINE: NEGATIVE
LYMPHOCYTES # BLD AUTO: 1.24 K/UL
LYMPHOCYTES NFR BLD AUTO: 17.5 %
MAN DIFF?: NORMAL
MCHC RBC-ENTMCNC: 30.4 PG
MCHC RBC-ENTMCNC: 31.4 GM/DL
MCV RBC AUTO: 96.7 FL
MICROSCOPIC-UA: NORMAL
MONOCYTES # BLD AUTO: 0.71 K/UL
MONOCYTES NFR BLD AUTO: 10 %
NEUTROPHILS # BLD AUTO: 4.86 K/UL
NEUTROPHILS NFR BLD AUTO: 68.4 %
NITRITE URINE: NEGATIVE
NONHDLC SERPL-MCNC: 123 MG/DL
PH URINE: 7
PLATELET # BLD AUTO: 285 K/UL
POTASSIUM SERPL-SCNC: 4.7 MMOL/L
PROT SERPL-MCNC: 7.1 G/DL
PROTEIN URINE: NORMAL
PSA SERPL-MCNC: 0.64 NG/ML
RBC # BLD: 4.61 M/UL
RBC # FLD: 13.6 %
RED BLOOD CELLS URINE: 2 /HPF
SODIUM SERPL-SCNC: 139 MMOL/L
SPECIFIC GRAVITY URINE: 1.02
SQUAMOUS EPITHELIAL CELLS: 0 /HPF
TIBC SERPL-MCNC: 299 UG/DL
TRIGL SERPL-MCNC: 143 MG/DL
UIBC SERPL-MCNC: 226 UG/DL
UROBILINOGEN URINE: NORMAL
WBC # FLD AUTO: 7.1 K/UL
WHITE BLOOD CELLS URINE: 1 /HPF

## 2021-06-04 RX ORDER — ATORVASTATIN CALCIUM 20 MG/1
20 TABLET, FILM COATED ORAL
Refills: 0 | Status: DISCONTINUED | COMMUNITY
End: 2021-06-04

## 2021-06-16 ENCOUNTER — NON-APPOINTMENT (OUTPATIENT)
Age: 66
End: 2021-06-16

## 2021-06-16 ENCOUNTER — APPOINTMENT (OUTPATIENT)
Dept: CARDIOLOGY | Facility: CLINIC | Age: 66
End: 2021-06-16
Payer: MEDICARE

## 2021-06-16 VITALS
SYSTOLIC BLOOD PRESSURE: 136 MMHG | OXYGEN SATURATION: 98 % | WEIGHT: 213 LBS | HEIGHT: 69 IN | DIASTOLIC BLOOD PRESSURE: 80 MMHG | BODY MASS INDEX: 31.55 KG/M2 | HEART RATE: 72 BPM

## 2021-06-16 PROCEDURE — 93000 ELECTROCARDIOGRAM COMPLETE: CPT

## 2021-06-16 PROCEDURE — 99214 OFFICE O/P EST MOD 30 MIN: CPT

## 2021-06-16 NOTE — PHYSICAL EXAM
[Well Developed] : well developed [Well Nourished] : well nourished [No Acute Distress] : no acute distress [Normal Conjunctiva] : normal conjunctiva [No Carotid Bruit] : no carotid bruit [Normal Venous Pressure] : normal venous pressure [Normal S1, S2] : normal S1, S2 [No Murmur] : no murmur [No Rub] : no rub [No Gallop] : no gallop [Clear Lung Fields] : clear lung fields [Good Air Entry] : good air entry [No Respiratory Distress] : no respiratory distress  [Non Tender] : non-tender [Soft] : abdomen soft [No Masses/organomegaly] : no masses/organomegaly [Normal Bowel Sounds] : normal bowel sounds [Normal Gait] : normal gait [No Edema] : no edema [No Cyanosis] : no cyanosis [No Clubbing] : no clubbing [No Varicosities] : no varicosities [No Rash] : no rash [No Skin Lesions] : no skin lesions [Moves all extremities] : moves all extremities [No Focal Deficits] : no focal deficits [Normal Speech] : normal speech [Alert and Oriented] : alert and oriented [Normal memory] : normal memory

## 2021-06-16 NOTE — DISCUSSION/SUMMARY
[With Me] : with me [FreeTextEntry1] : Mr. Burroughs is a 65 year old male with HLD, here for follow up.\par \par He recently had a cardiac catheterization demonstrating significant LAD and RCA disease.  He is now status post PCI to his LAD, and feels well.  He has no symptoms of chest pain, and his prior symptoms of heartburn have resolved.\par \par He will remain on aspirin, Plavix, and Crestor.  He is already seen an improvement in his LDL to 95.  He remains to have a  of his RCA (and OM1) and we will set him up for intervention on this next month.\par He will try to stay active, eat right, and lose weight.  He knows to call with any issues or concerns.
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
## 2021-06-23 ENCOUNTER — APPOINTMENT (OUTPATIENT)
Dept: INTERNAL MEDICINE | Facility: CLINIC | Age: 66
End: 2021-06-23
Payer: MEDICARE

## 2021-06-23 VITALS
BODY MASS INDEX: 41.23 KG/M2 | HEIGHT: 60 IN | RESPIRATION RATE: 12 BRPM | DIASTOLIC BLOOD PRESSURE: 75 MMHG | OXYGEN SATURATION: 96 % | SYSTOLIC BLOOD PRESSURE: 118 MMHG | TEMPERATURE: 98.1 F | WEIGHT: 210 LBS | HEART RATE: 89 BPM

## 2021-06-23 PROCEDURE — 99213 OFFICE O/P EST LOW 20 MIN: CPT

## 2021-06-23 NOTE — HISTORY OF PRESENT ILLNESS
[FreeTextEntry1] : C/O COUGH  [de-identified] : PATIENT C/O COUGH , SPUTUM X 2 WEEKS , NO FEVER , CHILLS , CHEST PAINS , DYSPNEA

## 2021-06-23 NOTE — PHYSICAL EXAM
[No Acute Distress] : no acute distress [Normal Outer Ear/Nose] : the outer ears and nose were normal in appearance [Normal Oropharynx] : the oropharynx was normal [No Lymphadenopathy] : no lymphadenopathy [Normal] : soft, non-tender, non-distended, no masses palpated, no HSM and normal bowel sounds

## 2021-08-09 ENCOUNTER — APPOINTMENT (OUTPATIENT)
Dept: DISASTER EMERGENCY | Facility: CLINIC | Age: 66
End: 2021-08-09

## 2021-08-10 LAB — SARS-COV-2 N GENE NPH QL NAA+PROBE: NOT DETECTED

## 2021-08-12 ENCOUNTER — INPATIENT (INPATIENT)
Facility: HOSPITAL | Age: 66
LOS: 0 days | Discharge: HOME | DRG: 251 | End: 2021-08-13
Attending: INTERNAL MEDICINE | Admitting: INTERNAL MEDICINE
Payer: COMMERCIAL

## 2021-08-12 VITALS
HEART RATE: 76 BPM | OXYGEN SATURATION: 97 % | HEIGHT: 69 IN | RESPIRATION RATE: 16 BRPM | WEIGHT: 203.93 LBS | TEMPERATURE: 98 F | SYSTOLIC BLOOD PRESSURE: 127 MMHG | DIASTOLIC BLOOD PRESSURE: 76 MMHG

## 2021-08-12 DIAGNOSIS — I25.10 ATHEROSCLEROTIC HEART DISEASE OF NATIVE CORONARY ARTERY WITHOUT ANGINA PECTORIS: ICD-10-CM

## 2021-08-12 DIAGNOSIS — Z95.5 PRESENCE OF CORONARY ANGIOPLASTY IMPLANT AND GRAFT: Chronic | ICD-10-CM

## 2021-08-12 LAB
ANION GAP SERPL CALC-SCNC: 13 MMOL/L — SIGNIFICANT CHANGE UP (ref 5–17)
BLD GP AB SCN SERPL QL: NEGATIVE — SIGNIFICANT CHANGE UP
BUN SERPL-MCNC: 15 MG/DL — SIGNIFICANT CHANGE UP (ref 7–23)
CALCIUM SERPL-MCNC: 9.9 MG/DL — SIGNIFICANT CHANGE UP (ref 8.4–10.5)
CHLORIDE SERPL-SCNC: 105 MMOL/L — SIGNIFICANT CHANGE UP (ref 96–108)
CO2 SERPL-SCNC: 21 MMOL/L — LOW (ref 22–31)
CREAT SERPL-MCNC: 0.82 MG/DL — SIGNIFICANT CHANGE UP (ref 0.5–1.3)
GLUCOSE SERPL-MCNC: 105 MG/DL — HIGH (ref 70–99)
HCT VFR BLD CALC: 44.2 % — SIGNIFICANT CHANGE UP (ref 39–50)
HGB BLD-MCNC: 14.8 G/DL — SIGNIFICANT CHANGE UP (ref 13–17)
MCHC RBC-ENTMCNC: 30.5 PG — SIGNIFICANT CHANGE UP (ref 27–34)
MCHC RBC-ENTMCNC: 33.5 GM/DL — SIGNIFICANT CHANGE UP (ref 32–36)
MCV RBC AUTO: 90.9 FL — SIGNIFICANT CHANGE UP (ref 80–100)
NRBC # BLD: 0 /100 WBCS — SIGNIFICANT CHANGE UP (ref 0–0)
PLATELET # BLD AUTO: 298 K/UL — SIGNIFICANT CHANGE UP (ref 150–400)
POTASSIUM SERPL-MCNC: 4.2 MMOL/L — SIGNIFICANT CHANGE UP (ref 3.5–5.3)
POTASSIUM SERPL-SCNC: 4.2 MMOL/L — SIGNIFICANT CHANGE UP (ref 3.5–5.3)
RBC # BLD: 4.86 M/UL — SIGNIFICANT CHANGE UP (ref 4.2–5.8)
RBC # FLD: 12.6 % — SIGNIFICANT CHANGE UP (ref 10.3–14.5)
RH IG SCN BLD-IMP: POSITIVE — SIGNIFICANT CHANGE UP
SODIUM SERPL-SCNC: 139 MMOL/L — SIGNIFICANT CHANGE UP (ref 135–145)
WBC # BLD: 9.04 K/UL — SIGNIFICANT CHANGE UP (ref 3.8–10.5)
WBC # FLD AUTO: 9.04 K/UL — SIGNIFICANT CHANGE UP (ref 3.8–10.5)

## 2021-08-12 PROCEDURE — 92920 PRQ TRLUML C ANGIOP 1ART&/BR: CPT | Mod: LC

## 2021-08-12 PROCEDURE — 99152 MOD SED SAME PHYS/QHP 5/>YRS: CPT

## 2021-08-12 PROCEDURE — 93010 ELECTROCARDIOGRAM REPORT: CPT | Mod: 76,77

## 2021-08-12 RX ORDER — ROSUVASTATIN CALCIUM 5 MG/1
20 TABLET ORAL AT BEDTIME
Refills: 0 | Status: DISCONTINUED | OUTPATIENT
Start: 2021-08-12 | End: 2021-08-13

## 2021-08-12 RX ORDER — ASPIRIN/CALCIUM CARB/MAGNESIUM 324 MG
81 TABLET ORAL DAILY
Refills: 0 | Status: DISCONTINUED | OUTPATIENT
Start: 2021-08-13 | End: 2021-08-13

## 2021-08-12 RX ORDER — FAMOTIDINE 10 MG/ML
20 INJECTION INTRAVENOUS DAILY
Refills: 0 | Status: DISCONTINUED | OUTPATIENT
Start: 2021-08-12 | End: 2021-08-13

## 2021-08-12 RX ORDER — SODIUM CHLORIDE 9 MG/ML
3 INJECTION INTRAMUSCULAR; INTRAVENOUS; SUBCUTANEOUS EVERY 8 HOURS
Refills: 0 | Status: DISCONTINUED | OUTPATIENT
Start: 2021-08-12 | End: 2021-08-13

## 2021-08-12 RX ORDER — CLOPIDOGREL BISULFATE 75 MG/1
75 TABLET, FILM COATED ORAL DAILY
Refills: 0 | Status: DISCONTINUED | OUTPATIENT
Start: 2021-08-13 | End: 2021-08-13

## 2021-08-12 RX ORDER — ATORVASTATIN CALCIUM 80 MG/1
80 TABLET, FILM COATED ORAL AT BEDTIME
Refills: 0 | Status: DISCONTINUED | OUTPATIENT
Start: 2021-08-12 | End: 2021-08-12

## 2021-08-12 RX ADMIN — ROSUVASTATIN CALCIUM 20 MILLIGRAM(S): 5 TABLET ORAL at 21:58

## 2021-08-12 RX ADMIN — SODIUM CHLORIDE 3 MILLILITER(S): 9 INJECTION INTRAMUSCULAR; INTRAVENOUS; SUBCUTANEOUS at 20:46

## 2021-08-12 NOTE — ASU DISCHARGE PLAN (ADULT/PEDIATRIC) - ASU DC SPECIAL INSTRUCTIONSFT
No heavy lifting, strenuous activity, bending, straining, or unnecessary stair climbing for 2 weeks. No driving for 2 days. You may shower 24 hours following the procedure but avoid baths/swimming for 1 week. Check your GROIN site and RADIAL site  for bleeding and/or swelling daily following procedure and call your doctor immediately if it occurs or if you experience increased pain at the site. Follow up with your cardiologist in 1-2 weeks. You may call [facility] if you have any questions/concerns regarding your procedure [phone].  FOLLOW UP WITH DR. ELLISON IN 4 WEEKS

## 2021-08-12 NOTE — CHART NOTE - NSCHARTNOTEFT_GEN_A_CORE
s/p  LCX intervention  baseline SBP 120s   during recovery pt c/o mild chest pressure and SBP was in 90s  Bedside ECHO was performed to r/o effusion  Normal saline 200cc given (due to decreased LV function)  Current -115   HR  60-70 frequent PVCs       #8 Afghan sheath was removed from right femoral artery manual pressure held with good hemostasis VSS  No bleeding/ hematoma. Pt tolerated well. Disposition pending.  d/w Dr. Reynaga (Interventional fellow)
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
## 2021-08-12 NOTE — ASU DISCHARGE PLAN (ADULT/PEDIATRIC) - CARE PROVIDER_API CALL
Jason Miller)  Cardiology; Internal Medicine; Interventional Cardiology  [facility]- Dept of Cardiology, [address] [place] [address]  Phone: [phone]  Fax: [phone]  Follow Up Time:
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
## 2021-08-13 ENCOUNTER — TRANSCRIPTION ENCOUNTER (OUTPATIENT)
Age: 66
End: 2021-08-13

## 2021-08-13 VITALS
HEART RATE: 90 BPM | RESPIRATION RATE: 17 BRPM | TEMPERATURE: 99 F | OXYGEN SATURATION: 95 % | DIASTOLIC BLOOD PRESSURE: 66 MMHG | SYSTOLIC BLOOD PRESSURE: 120 MMHG

## 2021-08-13 PROCEDURE — 99152 MOD SED SAME PHYS/QHP 5/>YRS: CPT

## 2021-08-13 PROCEDURE — C1725: CPT

## 2021-08-13 PROCEDURE — 92943 PRQ TRLUML REVSC CH OCC ANT: CPT

## 2021-08-13 PROCEDURE — 93321 DOPPLER ECHO F-UP/LMTD STD: CPT

## 2021-08-13 PROCEDURE — 99153 MOD SED SAME PHYS/QHP EA: CPT

## 2021-08-13 PROCEDURE — 86900 BLOOD TYPING SEROLOGIC ABO: CPT

## 2021-08-13 PROCEDURE — C1887: CPT

## 2021-08-13 PROCEDURE — C1769: CPT

## 2021-08-13 PROCEDURE — 86901 BLOOD TYPING SEROLOGIC RH(D): CPT

## 2021-08-13 PROCEDURE — 93458 L HRT ARTERY/VENTRICLE ANGIO: CPT

## 2021-08-13 PROCEDURE — 86850 RBC ANTIBODY SCREEN: CPT

## 2021-08-13 PROCEDURE — 99222 1ST HOSP IP/OBS MODERATE 55: CPT

## 2021-08-13 PROCEDURE — 80048 BASIC METABOLIC PNL TOTAL CA: CPT

## 2021-08-13 PROCEDURE — 93308 TTE F-UP OR LMTD: CPT

## 2021-08-13 PROCEDURE — 85027 COMPLETE CBC AUTOMATED: CPT

## 2021-08-13 PROCEDURE — 93005 ELECTROCARDIOGRAM TRACING: CPT

## 2021-08-13 PROCEDURE — C1894: CPT

## 2021-08-13 PROCEDURE — 92920 PRQ TRLUML C ANGIOP 1ART&/BR: CPT | Mod: LC

## 2021-08-13 RX ORDER — LOSARTAN POTASSIUM 100 MG/1
1 TABLET, FILM COATED ORAL
Qty: 30 | Refills: 0
Start: 2021-08-13 | End: 2021-09-11

## 2021-08-13 RX ORDER — METOPROLOL TARTRATE 50 MG
25 TABLET ORAL DAILY
Refills: 0 | Status: DISCONTINUED | OUTPATIENT
Start: 2021-08-13 | End: 2021-08-13

## 2021-08-13 RX ORDER — METOPROLOL TARTRATE 50 MG
1 TABLET ORAL
Qty: 30 | Refills: 0
Start: 2021-08-13 | End: 2021-09-11

## 2021-08-13 RX ORDER — LOSARTAN POTASSIUM 100 MG/1
25 TABLET, FILM COATED ORAL DAILY
Refills: 0 | Status: DISCONTINUED | OUTPATIENT
Start: 2021-08-13 | End: 2021-08-13

## 2021-08-13 RX ADMIN — FAMOTIDINE 20 MILLIGRAM(S): 10 INJECTION INTRAVENOUS at 05:30

## 2021-08-13 RX ADMIN — CLOPIDOGREL BISULFATE 75 MILLIGRAM(S): 75 TABLET, FILM COATED ORAL at 05:30

## 2021-08-13 RX ADMIN — SODIUM CHLORIDE 3 MILLILITER(S): 9 INJECTION INTRAMUSCULAR; INTRAVENOUS; SUBCUTANEOUS at 06:21

## 2021-08-13 RX ADMIN — Medication 81 MILLIGRAM(S): at 05:30

## 2021-08-13 NOTE — DISCHARGE NOTE NURSING/CASE MANAGEMENT/SOCIAL WORK - PATIENT PORTAL LINK FT
You can access the FollowMyHealth Patient Portal offered by [facility] by registering at the following website: [email or web address]. By joining [employer]’s FollowMyHealth portal, you will also be able to view your health information using other applications (apps) compatible with our system.

## 2021-08-13 NOTE — DISCHARGE NOTE PROVIDER - NSDCMRMEDTOKEN_GEN_ALL_CORE_FT
Aspirin Enteric Coated 81 mg oral delayed release tablet: 1 tab(s) orally once a day  clopidogrel 75 mg oral tablet: 1 tab(s) orally once a day MDD:1 tablet daily  Crestor 20 mg oral tablet: 1 tab(s) orally once a day  losartan 25 mg oral tablet: 1 tab(s) orally once a day MDD:1  metoprolol succinate 25 mg oral tablet, extended release: 1 tab(s) orally once a day MDD:1  Pepcid 20 mg oral tablet: 1 tab(s) orally once a day
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
## 2021-08-13 NOTE — DISCHARGE NOTE PROVIDER - PROVIDER TOKENS
PROVIDER:[TOKEN:[[id number]],FOLLOWUP:[1 week],ESTABLISHEDPATIENT:[T]],PROVIDER:[TOKEN:[[id number]],FOLLOWUP:[1 month],ESTABLISHEDPATIENT:[T]]

## 2021-08-13 NOTE — PROGRESS NOTE ADULT - ASSESSMENT
Amberly (Cardiology CHIP/ Fellow)  [phone]    Post-PCI review  No further chest discomfort overnight  hemodynamically stable  RRA and RFA arteriotomies soft, no hematoma  Plan for D/C today and will arrange for F/U in Dr Miller clinic in ~4 weeks

## 2021-08-13 NOTE — DISCHARGE NOTE PROVIDER - CARE PROVIDER_API CALL
Familia Tineo)  Cardiovascular Disease; Internal Medicine  [address] [place], [place] [id number]  Phone: [phone]  Fax: [phone]  Established Patient  Follow Up Time: 1 week    Jason Miller)  Cardiology; Internal Medicine; Interventional Cardiology  [facility]- Dept of Cardiology, [address] [place] [id number]  Phone: [phone]  Fax: [phone]  Established Patient  Follow Up Time: 1 month
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
## 2021-08-13 NOTE — DISCHARGE NOTE PROVIDER - NSDCCPCAREPLAN_GEN_ALL_CORE_FT
PRINCIPAL DISCHARGE DIAGNOSIS  Diagnosis: CAD (coronary artery disease)  Assessment and Plan of Treatment: s/p attempted PCI of  LCx.  Do not stop your Aspirin or Plavix unless instructed to do so by your cardiologist.   Start Toprol and Losartan as prescribed.      SECONDARY DISCHARGE DIAGNOSES  Diagnosis: HLD (hyperlipidemia)  Assessment and Plan of Treatment: Continue with your cholesterol medications. Eat a heart healthy diet that is low in saturated fats and salt, and includes whole grains, fruits, vegetables and lean protein; exercise regularly (consult with your physician or cardiologist first); maintain a heart healthy weight; if you smoke - quit (A resource to help you stop smoking is the [facility] – phone number [phone].). Continue to follow with your primary physician or cardiologist.

## 2021-08-13 NOTE — DISCHARGE NOTE PROVIDER - NSDCCPTREATMENT_GEN_ALL_CORE_FT
PRINCIPAL PROCEDURE  Procedure: Left heart cardiac cath  Findings and Treatment: attempted PCI of  to LCx

## 2021-08-27 ENCOUNTER — NON-APPOINTMENT (OUTPATIENT)
Age: 66
End: 2021-08-27

## 2021-08-27 ENCOUNTER — APPOINTMENT (OUTPATIENT)
Dept: CARDIOLOGY | Facility: CLINIC | Age: 66
End: 2021-08-27
Payer: MEDICARE

## 2021-08-27 VITALS
HEART RATE: 63 BPM | SYSTOLIC BLOOD PRESSURE: 110 MMHG | WEIGHT: 214 LBS | DIASTOLIC BLOOD PRESSURE: 71 MMHG | BODY MASS INDEX: 31.7 KG/M2 | OXYGEN SATURATION: 97 % | HEIGHT: 69 IN

## 2021-08-27 PROCEDURE — 93000 ELECTROCARDIOGRAM COMPLETE: CPT

## 2021-08-27 PROCEDURE — 99214 OFFICE O/P EST MOD 30 MIN: CPT

## 2021-08-27 NOTE — DISCUSSION/SUMMARY
[With Me] : with me [___ Month(s)] : in [unfilled] month(s) [FreeTextEntry1] : Mr. Burroughs is a 65 year old male with HLD, here for follow up.\par \par He recently had a cardiac catheterization demonstrating significant LAD and RCA disease.  He is now status post PCI to his LAD, and more recently s/p a partially successful attempt to open the  of his Lcx.  He has no symptoms of chest pain, and his prior symptoms of heartburn have resolved.\par \par He will remain on aspirin, Plavix, and Crestor.  He is already seen an improvement in his LDL to 95.  He will remain on toprol XL and losartan for his cardiomyopathy, and we will repeat his echo in 2 months.\par He will return for a repeat cath in 4 or so weeks with Dr. Miller to re-evaluate his LCx.\par \par He will try to stay active, eat right, and lose weight.  He knows to call with any issues or concerns. I will see him again in 2 months.
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
## 2021-09-21 ENCOUNTER — APPOINTMENT (OUTPATIENT)
Dept: CARDIOLOGY | Facility: CLINIC | Age: 66
End: 2021-09-21
Payer: MEDICARE

## 2021-09-21 VITALS
HEIGHT: 69 IN | WEIGHT: 210 LBS | HEART RATE: 66 BPM | OXYGEN SATURATION: 100 % | DIASTOLIC BLOOD PRESSURE: 79 MMHG | SYSTOLIC BLOOD PRESSURE: 136 MMHG | BODY MASS INDEX: 31.1 KG/M2

## 2021-09-21 PROCEDURE — 93000 ELECTROCARDIOGRAM COMPLETE: CPT

## 2021-09-21 PROCEDURE — 99203 OFFICE O/P NEW LOW 30 MIN: CPT

## 2021-09-21 PROCEDURE — 99213 OFFICE O/P EST LOW 20 MIN: CPT

## 2021-09-21 RX ORDER — AZITHROMYCIN 250 MG/1
250 TABLET, FILM COATED ORAL
Qty: 1 | Refills: 0 | Status: DISCONTINUED | COMMUNITY
Start: 2021-06-23 | End: 2021-09-21

## 2021-09-21 RX ORDER — ASPIRIN 81 MG
81 TABLET, DELAYED RELEASE (ENTERIC COATED) ORAL
Refills: 0 | Status: ACTIVE | COMMUNITY

## 2021-09-25 NOTE — HISTORY OF PRESENT ILLNESS
[FreeTextEntry1] : Mr Burroughs is a 66 year old man with HLD who has been seeing Dr GLADYS Tineo for severe coronary disease.  Earlier this year he had exertional angina and investigations showed LV dysfunction (LVEF 28%) and inferior and lateral ischemia. Angiogram May 2021 confirmed severe 3 vessel disease featuring LCx and RCA CTOs. Proximal LAD 80% stenosis was treated with 1 SENIA ([place] 4.0x38mm). He returned for  PCI of his LCx in August 2021 which was partially successful (no stents deployed). Since then he has been doing well. He does not experience angina and does not report any exertional symptoms. We explained in detail his procedures and plan for  PCI to his RCA in the near future. The indication would be for ischemia reduction. Mr Burroughs understands the risks and benefits of the procedure and we answered all questions.

## 2021-09-25 NOTE — DISCUSSION/SUMMARY
[FreeTextEntry1] : Patient would like to discuss with his family prior to proceeding with RCA -PCI procedure\par \par Plan\par 1. continue current medications\par 2. primary and secondary prevention strategies discussed in detail\par 3. recommend RCA -PCI ~late October if patient and family agreeable\par 4. otherwise continue to follow up with Dr GLADYS Tineo\par 5. Old records requested and reviewed with performing physician (via [place])\par 6. Primary and secondary prevention of cardiovascular and related conditions discussed at length, including but not limited to diet and lifestyle modification.\par 7. Patient to return to the office in 2 months.\par \par Thank you for allowing me to participate in the care of your patient. If you have any questions, please feel free to contact me at [phone] or via email at [email or web address].\par \par Sincerely,\par \par Jason Miller MD [facility]\par  of Cardiology\par Director, Cardiac Catheterization Laboratory\par Director, CHIP/ Program\par [facility]\par [facility]\par Tel: [phone]\par Email: [email or web address]

## 2021-09-25 NOTE — CARDIOLOGY SUMMARY
[de-identified] : 09/21/2021 - SR [de-identified] : 05/19/2021 - pLAD Anurag 4.0x38mm \par 08/12/2021 - LCx  successful wire cross, no stents deployed

## 2021-10-24 ENCOUNTER — APPOINTMENT (OUTPATIENT)
Dept: DISASTER EMERGENCY | Facility: CLINIC | Age: 66
End: 2021-10-24

## 2021-10-24 DIAGNOSIS — Z01.818 ENCOUNTER FOR OTHER PREPROCEDURAL EXAMINATION: ICD-10-CM

## 2021-10-25 LAB — SARS-COV-2 N GENE NPH QL NAA+PROBE: NOT DETECTED

## 2021-10-27 ENCOUNTER — TRANSCRIPTION ENCOUNTER (OUTPATIENT)
Age: 66
End: 2021-10-27

## 2021-10-27 ENCOUNTER — INPATIENT (INPATIENT)
Facility: HOSPITAL | Age: 66
LOS: 0 days | Discharge: HOME | DRG: 251 | End: 2021-10-28
Attending: INTERNAL MEDICINE | Admitting: INTERNAL MEDICINE
Payer: COMMERCIAL

## 2021-10-27 VITALS
TEMPERATURE: 98 F | HEART RATE: 74 BPM | WEIGHT: 210.1 LBS | SYSTOLIC BLOOD PRESSURE: 132 MMHG | HEIGHT: 69 IN | RESPIRATION RATE: 14 BRPM | DIASTOLIC BLOOD PRESSURE: 78 MMHG | OXYGEN SATURATION: 98 %

## 2021-10-27 DIAGNOSIS — Z95.5 PRESENCE OF CORONARY ANGIOPLASTY IMPLANT AND GRAFT: Chronic | ICD-10-CM

## 2021-10-27 DIAGNOSIS — I25.10 ATHEROSCLEROTIC HEART DISEASE OF NATIVE CORONARY ARTERY WITHOUT ANGINA PECTORIS: ICD-10-CM

## 2021-10-27 LAB
ANION GAP SERPL CALC-SCNC: 12 MMOL/L — SIGNIFICANT CHANGE UP (ref 5–17)
BUN SERPL-MCNC: 15 MG/DL — SIGNIFICANT CHANGE UP (ref 7–23)
CALCIUM SERPL-MCNC: 9.9 MG/DL — SIGNIFICANT CHANGE UP (ref 8.4–10.5)
CHLORIDE SERPL-SCNC: 107 MMOL/L — SIGNIFICANT CHANGE UP (ref 96–108)
CO2 SERPL-SCNC: 21 MMOL/L — LOW (ref 22–31)
CREAT SERPL-MCNC: 0.82 MG/DL — SIGNIFICANT CHANGE UP (ref 0.5–1.3)
GLUCOSE SERPL-MCNC: 104 MG/DL — HIGH (ref 70–99)
HCT VFR BLD CALC: 42.1 % — SIGNIFICANT CHANGE UP (ref 39–50)
HGB BLD-MCNC: 14.3 G/DL — SIGNIFICANT CHANGE UP (ref 13–17)
MCHC RBC-ENTMCNC: 30.1 PG — SIGNIFICANT CHANGE UP (ref 27–34)
MCHC RBC-ENTMCNC: 34 GM/DL — SIGNIFICANT CHANGE UP (ref 32–36)
MCV RBC AUTO: 88.6 FL — SIGNIFICANT CHANGE UP (ref 80–100)
NRBC # BLD: 0 /100 WBCS — SIGNIFICANT CHANGE UP (ref 0–0)
PLATELET # BLD AUTO: 261 K/UL — SIGNIFICANT CHANGE UP (ref 150–400)
POTASSIUM SERPL-MCNC: 4.2 MMOL/L — SIGNIFICANT CHANGE UP (ref 3.5–5.3)
POTASSIUM SERPL-SCNC: 4.2 MMOL/L — SIGNIFICANT CHANGE UP (ref 3.5–5.3)
RBC # BLD: 4.75 M/UL — SIGNIFICANT CHANGE UP (ref 4.2–5.8)
RBC # FLD: 12.9 % — SIGNIFICANT CHANGE UP (ref 10.3–14.5)
SODIUM SERPL-SCNC: 140 MMOL/L — SIGNIFICANT CHANGE UP (ref 135–145)
WBC # BLD: 7.79 K/UL — SIGNIFICANT CHANGE UP (ref 3.8–10.5)
WBC # FLD AUTO: 7.79 K/UL — SIGNIFICANT CHANGE UP (ref 3.8–10.5)

## 2021-10-27 PROCEDURE — 93010 ELECTROCARDIOGRAM REPORT: CPT | Mod: 77

## 2021-10-27 PROCEDURE — 93321 DOPPLER ECHO F-UP/LMTD STD: CPT | Mod: 26

## 2021-10-27 PROCEDURE — 93308 TTE F-UP OR LMTD: CPT | Mod: 26

## 2021-10-27 PROCEDURE — 93010 ELECTROCARDIOGRAM REPORT: CPT

## 2021-10-27 RX ORDER — CLOPIDOGREL BISULFATE 75 MG/1
75 TABLET, FILM COATED ORAL DAILY
Refills: 0 | Status: DISCONTINUED | OUTPATIENT
Start: 2021-10-27 | End: 2021-10-28

## 2021-10-27 RX ORDER — LOSARTAN POTASSIUM 100 MG/1
25 TABLET, FILM COATED ORAL DAILY
Refills: 0 | Status: DISCONTINUED | OUTPATIENT
Start: 2021-10-27 | End: 2021-10-28

## 2021-10-27 RX ORDER — SODIUM CHLORIDE 9 MG/ML
250 INJECTION INTRAMUSCULAR; INTRAVENOUS; SUBCUTANEOUS ONCE
Refills: 0 | Status: COMPLETED | OUTPATIENT
Start: 2021-10-27 | End: 2021-10-27

## 2021-10-27 RX ORDER — METOPROLOL TARTRATE 50 MG
25 TABLET ORAL DAILY
Refills: 0 | Status: DISCONTINUED | OUTPATIENT
Start: 2021-10-27 | End: 2021-10-28

## 2021-10-27 RX ORDER — ASPIRIN/CALCIUM CARB/MAGNESIUM 324 MG
81 TABLET ORAL DAILY
Refills: 0 | Status: DISCONTINUED | OUTPATIENT
Start: 2021-10-27 | End: 2021-10-28

## 2021-10-27 RX ORDER — FAMOTIDINE 10 MG/ML
20 INJECTION INTRAVENOUS DAILY
Refills: 0 | Status: DISCONTINUED | OUTPATIENT
Start: 2021-10-27 | End: 2021-10-28

## 2021-10-27 RX ORDER — ATORVASTATIN CALCIUM 80 MG/1
80 TABLET, FILM COATED ORAL AT BEDTIME
Refills: 0 | Status: DISCONTINUED | OUTPATIENT
Start: 2021-10-27 | End: 2021-10-28

## 2021-10-27 RX ADMIN — ATORVASTATIN CALCIUM 80 MILLIGRAM(S): 80 TABLET, FILM COATED ORAL at 21:21

## 2021-10-27 RX ADMIN — SODIUM CHLORIDE 750 MILLILITER(S): 9 INJECTION INTRAMUSCULAR; INTRAVENOUS; SUBCUTANEOUS at 17:58

## 2021-10-27 NOTE — CHART NOTE - NSCHARTNOTEFT_GEN_A_CORE
Removal of Femoral Sheath    Pulses in the right lower extremity are palpable. The patient was placed in the supine position. The insertion site was identified and the sutures were removed per protocol.  The 6French femoral sheath was then removed. Direct pressure was applied for  18 minutes.     Monitoring of the right groin and both lower extremities including neuro-vascular checks and vital signs every 15 minutes x 4, then every 30 minutes x 2, then every 1 hour was ordered.    Gauze and Tegaderm dressing placed. Patient instructed to keep extremity straight and that nursing staff will inform them when they can sit up.     Complications:     Comments:  on arrival to unit; sheath pulled ASAP by BENJAMIN Dumont, [facility]  Invasive Cardiology  x1130 Removal of Femoral Sheath    Pulses in the right lower extremity are palpable. The patient was placed in the supine position. The insertion site was identified and the sutures were removed per protocol.  The 8French femoral sheath was then removed. Direct pressure was applied for 20 minutes.     Monitoring of the right groin and both lower extremities including neuro-vascular checks and vital signs every 15 minutes x 4, then every 30 minutes x 2, then every 1 hour was ordered.    Gauze and Tegaderm dressing placed. Patient instructed to keep extremity straight and that nursing staff will inform them when they can sit up.     Complications:     Comments:  on arrival to unit due to protamine given in lab; sheath pulled ASAP by GUSTAVO AlvaradoKARINE-BC  Invasive Cardiology  x1130

## 2021-10-27 NOTE — H&P CARDIOLOGY - HISTORY OF PRESENT ILLNESS
65 year old male (no implantable devices) with a PMHx of CAD with stents, HLD and GERD, LV dysfunction presents tody for  PCI of RCA. Patient  had an angiogram in May 2021, revealed three vessel disease featuring LCx and RCA CTOs. PLAD 80 % stenosis was treated with SENIA and had PCI of LCx in August 2021. Now patient presents for  of RCA PCI. Denies any implanted cardiac device.  Cardiologist: Dr. Tineo  65 year old male (no implantable devices) with a PMHx of CAD with stents, HLD and GERD, LV dysfunction presents tody for  PCI of RCA. Patient  had an angiogram in May 2021, revealed three vessel disease featuring LCx and RCA CTOs. PLAD 80 % stenosis was treated with SENIA and had PCI of LCx in August 2021( incomplete). Now patient presents for PCI of LCx and possible PCI of  of RCA. Denies any implanted cardiac device.  Cardiologist: Dr. Tinoe

## 2021-10-27 NOTE — CHART NOTE - NSCHARTNOTEFT_GEN_A_CORE
Patient underwent a PCI procedure and is being admitted as they are at increased risk for major adverse cardiac and vascular events if discharged due to the following high risk characteristics:      Pre-procedure Clinical Criteria  Major: LV dysfunction       Angiographic Criteria   Major: coronary perforation/    Received s/p Lx POBA and RCA perf. Patient hemodynamically stable, A&Ox4.     Aria Dumont [facility]  Invasive Cardiology  x1130

## 2021-10-28 ENCOUNTER — TRANSCRIPTION ENCOUNTER (OUTPATIENT)
Age: 66
End: 2021-10-28

## 2021-10-28 VITALS
SYSTOLIC BLOOD PRESSURE: 119 MMHG | HEART RATE: 75 BPM | DIASTOLIC BLOOD PRESSURE: 69 MMHG | TEMPERATURE: 98 F | RESPIRATION RATE: 16 BRPM | OXYGEN SATURATION: 96 %

## 2021-10-28 LAB
ANION GAP SERPL CALC-SCNC: 14 MMOL/L — SIGNIFICANT CHANGE UP (ref 5–17)
BUN SERPL-MCNC: 19 MG/DL — SIGNIFICANT CHANGE UP (ref 7–23)
CALCIUM SERPL-MCNC: 9.3 MG/DL — SIGNIFICANT CHANGE UP (ref 8.4–10.5)
CHLORIDE SERPL-SCNC: 106 MMOL/L — SIGNIFICANT CHANGE UP (ref 96–108)
CO2 SERPL-SCNC: 19 MMOL/L — LOW (ref 22–31)
CREAT SERPL-MCNC: 0.9 MG/DL — SIGNIFICANT CHANGE UP (ref 0.5–1.3)
GLUCOSE SERPL-MCNC: 87 MG/DL — SIGNIFICANT CHANGE UP (ref 70–99)
HCT VFR BLD CALC: 38.3 % — LOW (ref 39–50)
HGB BLD-MCNC: 13 G/DL — SIGNIFICANT CHANGE UP (ref 13–17)
MCHC RBC-ENTMCNC: 30.2 PG — SIGNIFICANT CHANGE UP (ref 27–34)
MCHC RBC-ENTMCNC: 33.9 GM/DL — SIGNIFICANT CHANGE UP (ref 32–36)
MCV RBC AUTO: 89.1 FL — SIGNIFICANT CHANGE UP (ref 80–100)
NRBC # BLD: 0 /100 WBCS — SIGNIFICANT CHANGE UP (ref 0–0)
PLATELET # BLD AUTO: 235 K/UL — SIGNIFICANT CHANGE UP (ref 150–400)
POTASSIUM SERPL-MCNC: 4 MMOL/L — SIGNIFICANT CHANGE UP (ref 3.5–5.3)
POTASSIUM SERPL-SCNC: 4 MMOL/L — SIGNIFICANT CHANGE UP (ref 3.5–5.3)
RBC # BLD: 4.3 M/UL — SIGNIFICANT CHANGE UP (ref 4.2–5.8)
RBC # FLD: 13.1 % — SIGNIFICANT CHANGE UP (ref 10.3–14.5)
SODIUM SERPL-SCNC: 139 MMOL/L — SIGNIFICANT CHANGE UP (ref 135–145)
WBC # BLD: 7.19 K/UL — SIGNIFICANT CHANGE UP (ref 3.8–10.5)
WBC # FLD AUTO: 7.19 K/UL — SIGNIFICANT CHANGE UP (ref 3.8–10.5)

## 2021-10-28 PROCEDURE — C1753: CPT

## 2021-10-28 PROCEDURE — C1887: CPT

## 2021-10-28 PROCEDURE — 92920 PRQ TRLUML C ANGIOP 1ART&/BR: CPT | Mod: LC

## 2021-10-28 PROCEDURE — 99152 MOD SED SAME PHYS/QHP 5/>YRS: CPT

## 2021-10-28 PROCEDURE — 99238 HOSP IP/OBS DSCHRG MGMT 30/<: CPT

## 2021-10-28 PROCEDURE — 86850 RBC ANTIBODY SCREEN: CPT

## 2021-10-28 PROCEDURE — 93321 DOPPLER ECHO F-UP/LMTD STD: CPT

## 2021-10-28 PROCEDURE — 93308 TTE F-UP OR LMTD: CPT

## 2021-10-28 PROCEDURE — 86901 BLOOD TYPING SEROLOGIC RH(D): CPT

## 2021-10-28 PROCEDURE — 80048 BASIC METABOLIC PNL TOTAL CA: CPT

## 2021-10-28 PROCEDURE — 93460 R&L HRT ART/VENTRICLE ANGIO: CPT

## 2021-10-28 PROCEDURE — 92978 ENDOLUMINL IVUS OCT C 1ST: CPT

## 2021-10-28 PROCEDURE — 85027 COMPLETE CBC AUTOMATED: CPT

## 2021-10-28 PROCEDURE — 99153 MOD SED SAME PHYS/QHP EA: CPT

## 2021-10-28 PROCEDURE — 86900 BLOOD TYPING SEROLOGIC ABO: CPT

## 2021-10-28 PROCEDURE — 93005 ELECTROCARDIOGRAM TRACING: CPT

## 2021-10-28 PROCEDURE — C9608: CPT

## 2021-10-28 PROCEDURE — C9607: CPT

## 2021-10-28 PROCEDURE — C1725: CPT

## 2021-10-28 PROCEDURE — C1894: CPT

## 2021-10-28 PROCEDURE — C1769: CPT

## 2021-10-28 RX ADMIN — Medication 81 MILLIGRAM(S): at 05:21

## 2021-10-28 RX ADMIN — FAMOTIDINE 20 MILLIGRAM(S): 10 INJECTION INTRAVENOUS at 05:20

## 2021-10-28 RX ADMIN — CLOPIDOGREL BISULFATE 75 MILLIGRAM(S): 75 TABLET, FILM COATED ORAL at 05:21

## 2021-10-28 RX ADMIN — LOSARTAN POTASSIUM 25 MILLIGRAM(S): 100 TABLET, FILM COATED ORAL at 05:22

## 2021-10-28 NOTE — DISCHARGE NOTE PROVIDER - NSDCFUADDINST_GEN_ALL_CORE_FT
Wound Care:   the day AFTER your procedure remove bandage GENTLY, and clean using  mild soap and gentle warm, water stream, pat dry. leave OPEN to air. YOU MAY SHOWER   DO NOT apply lotions, creams, ointments, powder, parfumes to your incision site  DO NOT SOAK your site for 1 week ( no baths, no pools, no tubs, etc...)  Check  your groin and /or wrist daily. A small amount of bruising, and soarness are normal  ACTIVITY: for 24 hours   - DO NOT DRIVE  - DO NOT make any important decisions or sign legal documents   - DO NOT operate heavy machineries   - you may resume sexual activity in 48 hours, unless otherwise instructed by your cardiologist   If your procedure was done through the WRIST: for the NEXT 3DAYS:  - avoid pushing, pulling, with that affected wrist   - avoid repeated movement of that hand and wrist ( eg: typing, hammering)  - DO NOT LIFT anything more than 5 lbs   If your procedure was done through the GROIN: for the NEXT 5 DAYS  - Limit climbing stairs, DO NOT soak in bathtub or pool  - no strenuous activities, pushing, pulling, straining  - Do not lift anything 10lbs or heavier   MEDICATION:   take your medications as explained ( see discharge paperwork)   If you received a STENT, you will be taking antiplatelet medications to KEEP YOUR STENT OPEN ( eg: Aspirin, Plavix, Brilinta, Effient, etc).  Take as prescribed DO NOT STOP taking them without consulting with your cardiologist first.   Follow heart healthy diet recommended by your doctor, , if you smoke STOP SMOKING ( may call [phone] for center of tobacco control if you need assistance)   CALL your doctor to make appointment in 2 WEEKS   ***CALL YOUR DOCTOR***  if you experience: fever, chills, body aches, or severe pain, swelling, redness, heat or yellow discharge at incision site  If you experience Bleeding or excruciating pain at the procedural site, swelling ( golf ball size) at your procedural site  If you experience CHEST PAIN  If you experience extremity numbness, tingling, temperature change ( of your procedural site)   If you are unable to reach your doctor, you may contact:   -Cardiology Office at [facility] at [phone] or   - [facility] [phone]  - [facility] [phone]

## 2021-10-28 NOTE — DISCHARGE NOTE PROVIDER - CARE PROVIDERS DIRECT ADDRESSES
,DirectAddress_Unknown ,DirectAddress_Unknown,[email or web address]

## 2021-10-28 NOTE — DISCHARGE NOTE PROVIDER - NSDCCPCAREPLAN_GEN_ALL_CORE_FT
PRINCIPAL DISCHARGE DIAGNOSIS  Diagnosis: CAD (coronary artery disease)  Assessment and Plan of Treatment: Do not stop your aspirin or Plavix unless instructed to do so by your cardiologist, they help keep your angioplastied arteries open.   No heavy lifting, strenuous activity, bending, straining, or unnecessary stair climbing for 2 weeks. No driving for 2 days. You may shower 24 hours following the procedure but avoid baths/swimming for 1 week. Check your groin site for bleeding and/or swelling daily following procedure and call your doctor immediately if it occurs or if you experience increased pain at the site. Follow up with your cardiologist in 1-2 weeks. You may call [facility] if you have any questions/concerns regarding your procedure [phone].      SECONDARY DISCHARGE DIAGNOSES  Diagnosis: HTN (hypertension)  Assessment and Plan of Treatment: Continue with your blood pressure medications; eat a heart healthy diet with low salt diet; exercise regularly (consult with your physician or cardiologist first); maintain a heart healthy weight; if you smoke - quit (A resource to help you stop smoking is the [facility] – phone number [phone].); include healthy ways to manage stress. Continue to follow with your primary care physician or cardiologist.    Diagnosis: HLD (hyperlipidemia)  Assessment and Plan of Treatment: Continue with your cholesterol medications. Eat a heart healthy diet that is low in saturated fats and salt, and includes whole grains, fruits, vegetables and lean protein; exercise regularly (consult with your physician or cardiologist first); maintain a heart healthy weight; if you smoke - quit (A resource to help you stop smoking is the [facility] [employer] for Tobacco Control – phone number [phone].). Continue to follow with your primary physician or cardiologist.     PRINCIPAL DISCHARGE DIAGNOSIS  Diagnosis: CAD (coronary artery disease)  Assessment and Plan of Treatment: Do not stop your aspirin or Plavix unless instructed to do so by your cardiologist, they help keep your angioplastied arteries open.         SECONDARY DISCHARGE DIAGNOSES  Diagnosis: HTN (hypertension)  Assessment and Plan of Treatment: Continue with your blood pressure medications; eat a heart healthy diet with low salt diet; exercise regularly (consult with your physician or cardiologist first); maintain a heart healthy weight; if you smoke - quit (A resource to help you stop smoking is the [facility] for Tobacco Control – phone number [phone].); include healthy ways to manage stress. Continue to follow with your primary care physician or cardiologist.    Diagnosis: HLD (hyperlipidemia)  Assessment and Plan of Treatment: Continue with your cholesterol medications. Eat a heart healthy diet that is low in saturated fats and salt, and includes whole grains, fruits, vegetables and lean protein; exercise regularly (consult with your physician or cardiologist first); maintain a heart healthy weight; if you smoke - quit (A resource to help you stop smoking is the [facility] for Tobacco Control – phone number [phone].). Continue to follow with your primary physician or cardiologist.

## 2021-10-28 NOTE — DISCHARGE NOTE PROVIDER - HOSPITAL COURSE
HPI:  65 year old male (no implantable devices) with a PMHx of CAD with stents, HLD and GERD, LV dysfunction presents tody for  PCI of RCA. Patient  had an angiogram in May 2021, revealed three vessel disease featuring LCx and RCA CTOs. PLAD 80 % stenosis was treated with SENAI and had PCI of LCx in August 2021( incomplete). Now patient presents for PCI of LCx and possible PCI of  of RCA. Denies any implanted cardiac device.  Cardiologist: Dr. Tineo  (27 Oct 2021 07:31)    10/27 cardiac cath with POBA to the left circ. Right radial and right groin sites without swelling, bleeding.

## 2021-10-28 NOTE — DISCHARGE NOTE PROVIDER - PROVIDER TOKENS
PROVIDER:[TOKEN:[[id number]],FOLLOWUP:[2 weeks]] PROVIDER:[TOKEN:[[id number]],FOLLOWUP:[2 weeks]],PROVIDER:[TOKEN:[[id number]],FOLLOWUP:[2 weeks],ESTABLISHEDPATIENT:[T]]

## 2021-10-28 NOTE — DISCHARGE NOTE NURSING/CASE MANAGEMENT/SOCIAL WORK - PATIENT PORTAL LINK FT
You can access the FollowMyHealth Patient Portal offered by [facility] by registering at the following website: [email or web address]. By joining [employer]’s FollowMyHealth portal, you will also be able to view your health information using other applications (apps) compatible with our system.

## 2021-10-28 NOTE — DISCHARGE NOTE PROVIDER - NSDCCPTREATMENT_GEN_ALL_CORE_FT
PRINCIPAL PROCEDURE  Procedure: Angioplasty, coronary artery, with stent insertion if indicated  Findings and Treatment: POBA to left circ, no stent placed

## 2021-10-28 NOTE — DISCHARGE NOTE PROVIDER - NSDCPNSUBOBJ_GEN_ALL_CORE
Patient is a 66y old  Male who presents with a chief complaint of         Allergies    Keflex (Hives)    Intolerances        Medications:  aspirin enteric coated 81 milliGRAM(s) Oral daily  atorvastatin 80 milliGRAM(s) Oral at bedtime  clopidogrel Tablet 75 milliGRAM(s) Oral daily  famotidine    Tablet 20 milliGRAM(s) Oral daily  losartan 25 milliGRAM(s) Oral daily  metoprolol succinate ER 25 milliGRAM(s) Oral daily      Vitals:  T(C): 36.7 (10-27-21 @ 19:05), Max: 36.7 (10-27-21 @ 12:35)  HR: 71 (10-27-21 @ 19:05) (68 - 80)  BP: 105/54 (10-27-21 @ 19:05) (101/49 - 138/75)  BP(mean): 65 (10-27-21 @ 19:05) (61 - 106)  RR: 16 (10-27-21 @ 19:05) (14 - 16)  SpO2: 96% (10-27-21 @ 19:05) (96% - 98%)  Wt(kg): --  Daily Height in cm: 175.26 (27 Oct 2021 07:38)    Daily Weight in k.3 (27 Oct 2021 07:31)  I&O's Summary    27 Oct 2021 07:01  -  28 Oct 2021 02:39  --------------------------------------------------------  IN: 360 mL / OUT: 400 mL / NET: -40 mL          Physical Exam:  Appearance: Normal  Eyes: PERRL, EOMI  HENT: Normal oral muscosa, NC/AT  Cardiovascular: S1S2, RRR, No M/R/G, no JVD, No Lower extremity edema  Procedural Access Site: No hematoma, Non-tender to palpation, 2+ pulse, No bruit, No Ecchymosis  Respiratory: Clear to auscultation bilaterally  Gastrointestinal: Soft, Non tender, Normal Bowel Sounds  Musculoskeletal: No clubbing, No joint deformity   Neurologic: Non-focal  Lymphatic: No lymphadenopathy  Psychiatry: AAOx3, Mood & affect appropriate  Skin: No rashes, No ecchymoses, No cyanosis    10-28    139  |  106  |  19  ----------------------------<  87  4.0   |  19<L>  |  0.90    Ca    9.3      28 Oct 2021 00:49              Lipid panel   Hgb A1c                         13.0   7.19  )-----------( 235      ( 28 Oct 2021 00:49 )             38.3         ECG: SR 76 bpm    Cath: Monitor groin site for swelling, bleeding. Continue ASA, Plavix     HTN: Continue antihypertensive medications with hold parameters     HLD: continue statin, confirm lipid panel results     Imaging:    Interpretation of Telemetry:

## 2021-10-28 NOTE — DISCHARGE NOTE PROVIDER - CARE PROVIDER_API CALL
Familia Tineo)  Cardiovascular Disease; Internal Medicine  [address], [place] [id number]  Phone: [phone]  Fax: [phone]  Follow Up Time: 2 weeks   Familia Tineo)  Cardiovascular Disease; Internal Medicine  [address] [place] [address], [place] [id number]  Phone: [phone]  Fax: [phone]  Follow Up Time: 2 weeks    Jason Miller)  Cardiology; Internal Medicine; Interventional Cardiology  [facility]- Dept of Cardiology, [address] [place] [address]  Phone: [phone]  Fax: [phone]  Established Patient  Follow Up Time: 2 weeks

## 2021-10-29 ENCOUNTER — TRANSCRIPTION ENCOUNTER (OUTPATIENT)
Age: 66
End: 2021-10-29

## 2021-10-29 ENCOUNTER — NON-APPOINTMENT (OUTPATIENT)
Age: 66
End: 2021-10-29

## 2021-10-29 ENCOUNTER — APPOINTMENT (OUTPATIENT)
Dept: CARDIOLOGY | Facility: CLINIC | Age: 66
End: 2021-10-29
Payer: MEDICARE

## 2021-10-29 VITALS
DIASTOLIC BLOOD PRESSURE: 82 MMHG | BODY MASS INDEX: 30.96 KG/M2 | OXYGEN SATURATION: 97 % | WEIGHT: 209 LBS | HEART RATE: 75 BPM | SYSTOLIC BLOOD PRESSURE: 134 MMHG | HEIGHT: 69 IN

## 2021-10-29 PROCEDURE — 99214 OFFICE O/P EST MOD 30 MIN: CPT

## 2021-10-29 PROCEDURE — 93000 ELECTROCARDIOGRAM COMPLETE: CPT

## 2021-11-02 ENCOUNTER — EMERGENCY (EMERGENCY)
Facility: HOSPITAL | Age: 66
LOS: 1 days | Discharge: HOME | End: 2021-11-02
Attending: STUDENT IN AN ORGANIZED HEALTH CARE EDUCATION/TRAINING PROGRAM
Payer: MEDICARE

## 2021-11-02 VITALS
DIASTOLIC BLOOD PRESSURE: 90 MMHG | SYSTOLIC BLOOD PRESSURE: 152 MMHG | RESPIRATION RATE: 16 BRPM | HEIGHT: 69 IN | WEIGHT: 210.1 LBS | HEART RATE: 59 BPM | OXYGEN SATURATION: 99 % | TEMPERATURE: 98 F

## 2021-11-02 VITALS
SYSTOLIC BLOOD PRESSURE: 128 MMHG | OXYGEN SATURATION: 99 % | HEART RATE: 59 BPM | TEMPERATURE: 98 F | RESPIRATION RATE: 16 BRPM | DIASTOLIC BLOOD PRESSURE: 83 MMHG

## 2021-11-02 DIAGNOSIS — Z95.5 PRESENCE OF CORONARY ANGIOPLASTY IMPLANT AND GRAFT: Chronic | ICD-10-CM

## 2021-11-02 LAB
ALBUMIN SERPL ELPH-MCNC: 4.2 G/DL — SIGNIFICANT CHANGE UP (ref 3.3–5)
ALP SERPL-CCNC: 95 U/L — SIGNIFICANT CHANGE UP (ref 40–120)
ALT FLD-CCNC: 32 U/L — SIGNIFICANT CHANGE UP (ref 10–45)
ANION GAP SERPL CALC-SCNC: 12 MMOL/L — SIGNIFICANT CHANGE UP (ref 5–17)
APTT BLD: 36.5 SEC — HIGH (ref 27.5–35.5)
AST SERPL-CCNC: 23 U/L — SIGNIFICANT CHANGE UP (ref 10–40)
BASOPHILS # BLD AUTO: 0.08 K/UL — SIGNIFICANT CHANGE UP (ref 0–0.2)
BASOPHILS NFR BLD AUTO: 0.9 % — SIGNIFICANT CHANGE UP (ref 0–2)
BILIRUB SERPL-MCNC: 0.3 MG/DL — SIGNIFICANT CHANGE UP (ref 0.2–1.2)
BUN SERPL-MCNC: 14 MG/DL — SIGNIFICANT CHANGE UP (ref 7–23)
CALCIUM SERPL-MCNC: 9.5 MG/DL — SIGNIFICANT CHANGE UP (ref 8.4–10.5)
CHLORIDE SERPL-SCNC: 105 MMOL/L — SIGNIFICANT CHANGE UP (ref 96–108)
CO2 SERPL-SCNC: 22 MMOL/L — SIGNIFICANT CHANGE UP (ref 22–31)
CREAT SERPL-MCNC: 0.84 MG/DL — SIGNIFICANT CHANGE UP (ref 0.5–1.3)
EOSINOPHIL # BLD AUTO: 0.29 K/UL — SIGNIFICANT CHANGE UP (ref 0–0.5)
EOSINOPHIL NFR BLD AUTO: 3.4 % — SIGNIFICANT CHANGE UP (ref 0–6)
GLUCOSE SERPL-MCNC: 97 MG/DL — SIGNIFICANT CHANGE UP (ref 70–99)
HCT VFR BLD CALC: 42 % — SIGNIFICANT CHANGE UP (ref 39–50)
HGB BLD-MCNC: 14.1 G/DL — SIGNIFICANT CHANGE UP (ref 13–17)
IMM GRANULOCYTES NFR BLD AUTO: 0.5 % — SIGNIFICANT CHANGE UP (ref 0–1.5)
INR BLD: 1.03 RATIO — SIGNIFICANT CHANGE UP (ref 0.88–1.16)
LYMPHOCYTES # BLD AUTO: 1.62 K/UL — SIGNIFICANT CHANGE UP (ref 1–3.3)
LYMPHOCYTES # BLD AUTO: 18.7 % — SIGNIFICANT CHANGE UP (ref 13–44)
MAGNESIUM SERPL-MCNC: 2.2 MG/DL — SIGNIFICANT CHANGE UP (ref 1.6–2.6)
MCHC RBC-ENTMCNC: 30.4 PG — SIGNIFICANT CHANGE UP (ref 27–34)
MCHC RBC-ENTMCNC: 33.6 GM/DL — SIGNIFICANT CHANGE UP (ref 32–36)
MCV RBC AUTO: 90.5 FL — SIGNIFICANT CHANGE UP (ref 80–100)
MONOCYTES # BLD AUTO: 0.93 K/UL — HIGH (ref 0–0.9)
MONOCYTES NFR BLD AUTO: 10.8 % — SIGNIFICANT CHANGE UP (ref 2–14)
NEUTROPHILS # BLD AUTO: 5.69 K/UL — SIGNIFICANT CHANGE UP (ref 1.8–7.4)
NEUTROPHILS NFR BLD AUTO: 65.7 % — SIGNIFICANT CHANGE UP (ref 43–77)
NRBC # BLD: 0 /100 WBCS — SIGNIFICANT CHANGE UP (ref 0–0)
NT-PROBNP SERPL-SCNC: 287 PG/ML — SIGNIFICANT CHANGE UP (ref 0–300)
PLATELET # BLD AUTO: 280 K/UL — SIGNIFICANT CHANGE UP (ref 150–400)
POTASSIUM SERPL-MCNC: 4.4 MMOL/L — SIGNIFICANT CHANGE UP (ref 3.5–5.3)
POTASSIUM SERPL-SCNC: 4.4 MMOL/L — SIGNIFICANT CHANGE UP (ref 3.5–5.3)
PROT SERPL-MCNC: 7.1 G/DL — SIGNIFICANT CHANGE UP (ref 6–8.3)
PROTHROM AB SERPL-ACNC: 12.3 SEC — SIGNIFICANT CHANGE UP (ref 10.6–13.6)
RBC # BLD: 4.64 M/UL — SIGNIFICANT CHANGE UP (ref 4.2–5.8)
RBC # FLD: 13.2 % — SIGNIFICANT CHANGE UP (ref 10.3–14.5)
SARS-COV-2 RNA SPEC QL NAA+PROBE: SIGNIFICANT CHANGE UP
SODIUM SERPL-SCNC: 139 MMOL/L — SIGNIFICANT CHANGE UP (ref 135–145)
TROPONIN T, HIGH SENSITIVITY RESULT: 10 NG/L — SIGNIFICANT CHANGE UP (ref 0–51)
TROPONIN T, HIGH SENSITIVITY RESULT: 10 NG/L — SIGNIFICANT CHANGE UP (ref 0–51)
WBC # BLD: 8.65 K/UL — SIGNIFICANT CHANGE UP (ref 3.8–10.5)
WBC # FLD AUTO: 8.65 K/UL — SIGNIFICANT CHANGE UP (ref 3.8–10.5)

## 2021-11-02 PROCEDURE — 85730 THROMBOPLASTIN TIME PARTIAL: CPT

## 2021-11-02 PROCEDURE — 85025 COMPLETE CBC W/AUTO DIFF WBC: CPT

## 2021-11-02 PROCEDURE — 80053 COMPREHEN METABOLIC PANEL: CPT

## 2021-11-02 PROCEDURE — 99285 EMERGENCY DEPT VISIT HI MDM: CPT | Mod: CS,GC

## 2021-11-02 PROCEDURE — 93970 EXTREMITY STUDY: CPT | Mod: 26

## 2021-11-02 PROCEDURE — 87635 SARS-COV-2 COVID-19 AMP PRB: CPT

## 2021-11-02 PROCEDURE — 99284 EMERGENCY DEPT VISIT MOD MDM: CPT | Mod: 25

## 2021-11-02 PROCEDURE — 71045 X-RAY EXAM CHEST 1 VIEW: CPT

## 2021-11-02 PROCEDURE — 99285 EMERGENCY DEPT VISIT HI MDM: CPT

## 2021-11-02 PROCEDURE — 83880 ASSAY OF NATRIURETIC PEPTIDE: CPT

## 2021-11-02 PROCEDURE — 93005 ELECTROCARDIOGRAM TRACING: CPT | Mod: 76

## 2021-11-02 PROCEDURE — 84484 ASSAY OF TROPONIN QUANT: CPT

## 2021-11-02 PROCEDURE — 93010 ELECTROCARDIOGRAM REPORT: CPT | Mod: GC

## 2021-11-02 PROCEDURE — 83735 ASSAY OF MAGNESIUM: CPT

## 2021-11-02 PROCEDURE — 71045 X-RAY EXAM CHEST 1 VIEW: CPT | Mod: 26

## 2021-11-02 PROCEDURE — 93970 EXTREMITY STUDY: CPT

## 2021-11-02 PROCEDURE — 85610 PROTHROMBIN TIME: CPT

## 2021-11-02 RX ORDER — ASPIRIN/CALCIUM CARB/MAGNESIUM 324 MG
162 TABLET ORAL ONCE
Refills: 0 | Status: COMPLETED | OUTPATIENT
Start: 2021-11-02 | End: 2021-11-02

## 2021-11-02 RX ADMIN — Medication 162 MILLIGRAM(S): at 09:53

## 2021-11-02 NOTE — ED ADULT NURSE NOTE - OBJECTIVE STATEMENT
65 y/o male coming in from home complaining of chest pain. AOx4, ambulatory, PMH HLD, GERD, stents placed last week. Pt. states he was in the cath lab on Wednesday of last week and had RCA successfully stented. Also has hx of left circumflex being stented. States he has been well since procedure until last night. Reports onset of midsternal chest pain that began at 10 pm last night and has been constant. This morning, experienced left leg pain and difficulty bending. States that leg pain has resolved at this time. Cath was done through right groin and right wrist.

## 2021-11-02 NOTE — ED PROVIDER NOTE - PATIENT PORTAL LINK FT
You can access the FollowMyHealth Patient Portal offered by [facility] by registering at the following website: [email or web address]. By joining [employer]’s FollowMyHealth portal, you will also be able to view your health information using other applications (apps) compatible with our system.
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
## 2021-11-02 NOTE — ED ADULT NURSE NOTE - NS ED NURSE LEVEL OF CONSCIOUSNESS MENTAL STATUS
Alba Hernandez is a 77 year old female presenting excision of left mid cheek had biopsy on 8/18/21.    HISTORY REVIEW:   Medications reviewed and updated.  No known allergies, denies know Latex allergy.   Patient's PCP was verified at today's visit.    If pathology is ordered, patient would like communication of their results via: Cell Phone. Okay to leave message with results: Yes.         Awake/Alert/Cooperative

## 2021-11-02 NOTE — ED PROVIDER NOTE - NS ED ROS FT
General: no fever, no chills  Eyes: no vision changes, no eye pain  Cardiovascular: +chest pain, no edema  Respiratory: no cough, no shortness of breath  Gastrointestinal: no abdominal pain, no nausea, no vomiting, no diarrhea  Genitourinary: no dysuria, no hematuria  Musculoskeletal: +LLE pain, no joint pain  Skin: no rash, no lesions  Neuro: no numbness, no tingling  Psych: no depression, no anxiety

## 2021-11-02 NOTE — ED ADULT NURSE NOTE - NSIMPLEMENTINTERV_GEN_ALL_ED
Implemented All Universal Safety Interventions:  [place] to call system. Call bell, personal items and telephone within reach. Instruct patient to call for assistance. Room bathroom lighting operational. Non-slip footwear when patient is off stretcher. Physically safe environment: no spills, clutter or unnecessary equipment. Stretcher in lowest position, wheels locked, appropriate side rails in place.

## 2021-11-02 NOTE — ED PROVIDER NOTE - PHYSICAL EXAMINATION
General: well appearing, no acute distress, AOx3  Skin: no rash, no pallor  Head: normocephalic, atraumatic  Eyes: clear conjunctiva, EOMI  ENMT: airway patent, no nasal discharge  Cardiovascular: normal rate, normal rhythm, S1/S2  Pulmonary: clear to auscultation bilaterally, no rales, rhonchi, or wheeze  Abdomen: soft, nontender, no guarding   Musculoskeletal: moving extremities well, no deformity, nontender on calves  Psych: normal mood, normal affect

## 2021-11-02 NOTE — ED PROVIDER NOTE - CLINICAL SUMMARY MEDICAL DECISION MAKING FREE TEXT BOX
Ac Lazaro, PGY-2- 66 year old male with a pmhx of CAD s/p stents, recent cath 2 days ago with unsuccessful RCA angioplasty, presenting for eval of cp since last night. Vitals stable on arrival. Plan to obtain ekg, cbc, cmp, trp, pro-bnp, coags, cxr, cardiac monitoring. Will give ASA. Consult patient's cardiologist
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
## 2021-11-02 NOTE — CONSULT NOTE ADULT - ASSESSMENT
Yan is a 66 year old male with CAD s/p PCI to his LAD in the past, HLD, GERD, severe LV dysfunction, presenting to ED for evaluation of chest pain beginning last night.  Of note, he is s/p POBA to his LCx with an excellent result on 10/29/21.    Pain is constant, with some atypical features, and seems to be worse when sitting forward.    - unclear etiology of pain, though could be combination of msk, reflux (he had pepppers and onions last night) and some post procedure inflammation  - ekg is unchanged from prior office ekgs  - initially worried about pericardial effusion, given attempt at the  of his RCA, though there is no effusion on bedside sonogram  - I am not suspecting acs, though await full set of blood work.  - repeat hs troponin in 3 hours after 1st set  - also reports calf pain, and would check a le doppler  - appears euvolemic on exam  - if the above evaluation is unremarkable, he can hopefully be discharged home, and will follow up with me in the office.
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
## 2021-11-02 NOTE — DISCUSSION/SUMMARY
[With Me] : with me [___ Month(s)] : in [unfilled] month(s) [FreeTextEntry1] : Mr. Burroughs is a 66 year old male with HLD, here for follow up.\par \par He recently had a cardiac catheterization demonstrating significant LAD and RCA disease.  He is now status post PCI to his LAD, and more recently s/p a partially successful attempt to open the  of his Lcx. He then returned to the lab, and is s/p POBA to his LCx with an excellent result.\par \par He has no symptoms of chest pain, and his prior symptoms of heartburn have resolved.\par \par He will remain on aspirin, Plavix, and Crestor.  He is already seen an improvement in his LDL to 95.  He will remain on toprol XL, though I will change his losartan to Entresto. We will repeat his echo in 3 months to see if there is any improvement in his LV function. \par \par He will try to stay active, eat right, and lose weight.  He knows to call with any issues or concerns. I will see him again in 1-2 months.

## 2021-11-02 NOTE — ED PROVIDER NOTE - PROGRESS NOTE DETAILS
Ac Bell, PGY-2- Labs pending. Awaiting return call for pt's cardiologist ap- pt w/ improvement of symptoms, he is pending duplex of the lower extremities, will discuss w/ cardiology pending results Ac Lazaro, PGY-2- Discussed patient with his cardiologist Dr. Tineo- who is agreeable with plan to dc home. Patient wants to go home, feels improved. Discussed results of work up with patient. Patient agrees with plan to discharge home. All questions answered regarding plan. Strict return precautions given.

## 2021-11-02 NOTE — ED ADULT NURSE NOTE - PAIN: PRESENCE, MLM
Patient calls back and states she absolutely doesn't want the wellness exam on 5/14/19 that she was called about.  Patient states she will keep the appointment with Dr. Ortez on 5/14/19 though.  Appointments updated.  
complains of pain/discomfort

## 2021-11-02 NOTE — ED PROVIDER NOTE - CARE PROVIDER_API CALL
Familia Tineo)  Cardiovascular Disease; Internal Medicine  [address], [place] [id number]  Phone: [phone]  Fax: [phone]  Follow Up Time: 1-3 Days

## 2021-11-02 NOTE — ED PROVIDER NOTE - OBJECTIVE STATEMENT
66 year old male with a pmhx of CAD s/p stents, HLD, GERD, LV dysfunction, presenting to ED for evaluation of chest pain that began last night. Patient reports mid sternal, constant pain. Worse with leaning forwards. S/p recent cath 2 days ago without stenting. R radial and R groin approach. Attempt was made to access RCA, but was unsuccessful. Patient reports no fever, chills, sob, cough, abd pain, vomiting, diarrhea, numbness, or tingling. Does note waking up with LLE pain that began this morning. Located behind knee radiating to calf, now resolved. On 81 mg ASA (took this morning) and Plavix.  Cardiologist- Dr. Tineo

## 2021-11-02 NOTE — ED PROVIDER NOTE - NSFOLLOWUPINSTRUCTIONS_ED_ALL_ED_FT
You were seen in the emergency department for chest pain.     An evaluation that was performed today did not show that you had a dangerous or life threatening cause of your pain.     PLEASE BE AWARE THAT AN EMERGENCY DEPARTMENT EVALUATION CANNOT FULLY RULE OUT ALL RISK OF POTENTIAL LIFE THREATENING CAUSES OF CHEST PAIN - INCLUDING A HEART ATTACK.     Please make an appointment to see your doctor in the next several days for a complete exam. If you have chest pain, dizziness, shortness of breath, numbness, profuse sweating, or pain that radiates to your arms or jaw - return to the ED promptly. Please ask your doctor for a referral for a stress test to evaluate the blood flow to your heart and possible risks of potential heart disease or heart attack    RETURN TO THE ED RIGHT AWAY IF:  - YOU HAVE FURTHER EPISODES OF CHEST PAIN, or if your pain changes, or radiates to your arm, back or jaw or if you have dizziness / sweating or feel that you may vomit - THIS IS AN EMERGENCY.     Do not wait to see if the pain will go away. Get medical help at once. Call your local emergency services ([phone]). Do not drive yourself to the hospital.     RETURN TO THE EMERGENCY DEPARTMENT IF:  - YOU HAVE TROUBLE BREATHING  - YOU FEEL THAT YOUR SYMPTOMS ARE WORSENING  - YOU HAVE FACIAL DROOP OR WEAKNESS ON ONE SIDE OF YOUR BODY  - YOU HAVE FEVERS OVER 100.5 THAT DO NOT GO DOWN WITH MOTRIN OR TYLENOL  - YOU HAVE CONTINUED VOMITING OR DIARRHEA AND YOU CANNOT TOLERATE DRINKING LIQUIDS    YOU MAY ALWAYS RETURN TO THE ED IF YOU FEEL SICK OR HAVE CONCERNS

## 2021-12-02 ENCOUNTER — RX RENEWAL (OUTPATIENT)
Age: 66
End: 2021-12-02

## 2021-12-10 ENCOUNTER — NON-APPOINTMENT (OUTPATIENT)
Age: 66
End: 2021-12-10

## 2021-12-10 ENCOUNTER — APPOINTMENT (OUTPATIENT)
Dept: CARDIOLOGY | Facility: CLINIC | Age: 66
End: 2021-12-10
Payer: MEDICARE

## 2021-12-10 VITALS
BODY MASS INDEX: 31.84 KG/M2 | OXYGEN SATURATION: 97 % | HEART RATE: 88 BPM | HEIGHT: 69 IN | DIASTOLIC BLOOD PRESSURE: 84 MMHG | WEIGHT: 215 LBS | SYSTOLIC BLOOD PRESSURE: 122 MMHG

## 2021-12-10 DIAGNOSIS — I50.20 UNSPECIFIED SYSTOLIC (CONGESTIVE) HEART FAILURE: ICD-10-CM

## 2021-12-10 PROCEDURE — 99214 OFFICE O/P EST MOD 30 MIN: CPT

## 2021-12-10 PROCEDURE — 93000 ELECTROCARDIOGRAM COMPLETE: CPT

## 2021-12-10 NOTE — DISCUSSION/SUMMARY
[With Me] : with me [___ Month(s)] : in [unfilled] month(s) [FreeTextEntry1] : Mr. Burroughs is a 66 year old male with HLD, here for follow up.\par \par He recently had a cardiac catheterization demonstrating significant LAD and RCA disease.  He is now status post PCI to his LAD, and more recently s/p a partially successful attempt to open the  of his Lcx. He then returned to the lab, and is s/p POBA to his LCx with an excellent result.\par \par He has no symptoms of chest pain, and his prior symptoms of heartburn have resolved.\par \par He will remain on aspirin, Plavix, and Crestor.  He is already seen an improvement in his LDL to 95.  He will remain on toprol XL. I tried to change his Losartan to Entresto, though we had issues with insurance. It is now time to repeat his LV function, now that he is >3 months post PCI.\par \par He will try to stay active, eat right, and lose weight.  He knows to call with any issues or concerns. I will see him again in 2-3 months.
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
## 2021-12-19 NOTE — ED ADULT NURSE NOTE - NURSING ED SKIN COLOR
79 y male with PMH of CKD3, HFrEF, HTN, HLD, DM presents with complaints of worsening swelling around his chronic b/l venous stasis wounds and generalized weakness and feeling cold.  patient was recently placed on new oral antibiotic for leg wounds but wife cannot recall which one.  per EMS patients vitals where stable throughout transport but notable for a rectal temp of 92.3 F in triage.  Denies HA, dizziness, weakness, fever, cough, CP, SOB, palpitations, Abd pain, N/V, dysuria, hematuria, dark or bloody stool. normal for race

## 2021-12-20 ENCOUNTER — APPOINTMENT (OUTPATIENT)
Dept: CARDIOLOGY | Facility: CLINIC | Age: 66
End: 2021-12-20

## 2022-02-22 ENCOUNTER — TRANSCRIPTION ENCOUNTER (OUTPATIENT)
Age: 67
End: 2022-02-22

## 2022-03-04 ENCOUNTER — APPOINTMENT (OUTPATIENT)
Dept: CARDIOLOGY | Facility: CLINIC | Age: 67
End: 2022-03-04
Payer: MEDICARE

## 2022-03-04 ENCOUNTER — NON-APPOINTMENT (OUTPATIENT)
Age: 67
End: 2022-03-04

## 2022-03-04 VITALS
SYSTOLIC BLOOD PRESSURE: 155 MMHG | WEIGHT: 215 LBS | HEART RATE: 86 BPM | OXYGEN SATURATION: 96 % | BODY MASS INDEX: 31.84 KG/M2 | HEIGHT: 69 IN | DIASTOLIC BLOOD PRESSURE: 90 MMHG

## 2022-03-04 VITALS — DIASTOLIC BLOOD PRESSURE: 82 MMHG | SYSTOLIC BLOOD PRESSURE: 134 MMHG

## 2022-03-04 LAB
ALBUMIN SERPL ELPH-MCNC: 4.4 G/DL
ALP BLD-CCNC: 89 U/L
ALT SERPL-CCNC: 21 U/L
ANION GAP SERPL CALC-SCNC: 13 MMOL/L
AST SERPL-CCNC: 22 U/L
BASOPHILS # BLD AUTO: 0.06 K/UL
BASOPHILS NFR BLD AUTO: 0.9 %
BILIRUB SERPL-MCNC: 0.4 MG/DL
BUN SERPL-MCNC: 18 MG/DL
CALCIUM SERPL-MCNC: 9.6 MG/DL
CHLORIDE SERPL-SCNC: 105 MMOL/L
CHOLEST SERPL-MCNC: 200 MG/DL
CO2 SERPL-SCNC: 23 MMOL/L
CREAT SERPL-MCNC: 0.87 MG/DL
EGFR: 96 ML/MIN/1.73M2
EOSINOPHIL # BLD AUTO: 0.29 K/UL
EOSINOPHIL NFR BLD AUTO: 4.5 %
GLUCOSE SERPL-MCNC: 102 MG/DL
HCT VFR BLD CALC: 44.5 %
HDLC SERPL-MCNC: 44 MG/DL
HGB BLD-MCNC: 14.5 G/DL
IMM GRANULOCYTES NFR BLD AUTO: 0.2 %
LDLC SERPL CALC-MCNC: 124 MG/DL
LYMPHOCYTES # BLD AUTO: 1.32 K/UL
LYMPHOCYTES NFR BLD AUTO: 20.6 %
MAN DIFF?: NORMAL
MCHC RBC-ENTMCNC: 30.8 PG
MCHC RBC-ENTMCNC: 32.6 GM/DL
MCV RBC AUTO: 94.5 FL
MONOCYTES # BLD AUTO: 0.71 K/UL
MONOCYTES NFR BLD AUTO: 11.1 %
NEUTROPHILS # BLD AUTO: 4.01 K/UL
NEUTROPHILS NFR BLD AUTO: 62.7 %
NONHDLC SERPL-MCNC: 155 MG/DL
PLATELET # BLD AUTO: 226 K/UL
POTASSIUM SERPL-SCNC: 4.5 MMOL/L
PROT SERPL-MCNC: 6.9 G/DL
RBC # BLD: 4.71 M/UL
RBC # FLD: 12.7 %
SODIUM SERPL-SCNC: 140 MMOL/L
TRIGL SERPL-MCNC: 154 MG/DL
TSH SERPL-ACNC: 1.7 UIU/ML
WBC # FLD AUTO: 6.4 K/UL

## 2022-03-04 PROCEDURE — 99214 OFFICE O/P EST MOD 30 MIN: CPT

## 2022-03-04 PROCEDURE — 93000 ELECTROCARDIOGRAM COMPLETE: CPT

## 2022-03-04 NOTE — DISCUSSION/SUMMARY
[With Me] : with me [___ Month(s)] : in [unfilled] month(s) [FreeTextEntry1] : Mr. Burroughs is a 66 year old male with HLD, here for follow up.\par \par He recently had a cardiac catheterization demonstrating significant LAD and RCA disease.  He is now status post PCI to his LAD, and more recently s/p a partially successful attempt to open the  of his Lcx. He then returned to the lab, and is s/p POBA to his LCx with an excellent result.\par \par He has no worrisome symptoms of chest pain, and his prior symptoms of heartburn have resolved.\par \par He will remain on aspirin, Plavix, and Crestor.  He is already seen an improvement in his LDL to 95, which will be repeated today.  He will remain on toprol XL, though will take 1/2 the dose because of fatigue. His LV function has significantly improved (also noted were LVH and MR).\par \par He will try to stay active, eat right, and lose weight.  He knows to call with any issues or concerns. There is no issue with him stopping his Plavix 5 days prior to dental work. I will see him again in 3 months.
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
## 2022-03-07 LAB
ESTIMATED AVERAGE GLUCOSE: 120 MG/DL
HBA1C MFR BLD HPLC: 5.8 %

## 2022-03-10 NOTE — ED ADULT NURSE NOTE - NURSING MUSC ROM
Plan: Vaseline w/ gloves Continue Regimen: Triamcinolone (as needed on hands) Detail Level: Zone full range of motion in all extremities

## 2022-04-08 ENCOUNTER — APPOINTMENT (OUTPATIENT)
Dept: INTERNAL MEDICINE | Facility: CLINIC | Age: 67
End: 2022-04-08
Payer: MEDICARE

## 2022-04-08 VITALS
TEMPERATURE: 98.7 F | BODY MASS INDEX: 30.66 KG/M2 | DIASTOLIC BLOOD PRESSURE: 72 MMHG | RESPIRATION RATE: 12 BRPM | SYSTOLIC BLOOD PRESSURE: 108 MMHG | WEIGHT: 207 LBS | HEIGHT: 69 IN | HEART RATE: 87 BPM | OXYGEN SATURATION: 96 %

## 2022-04-08 DIAGNOSIS — L30.9 DERMATITIS, UNSPECIFIED: ICD-10-CM

## 2022-04-08 PROCEDURE — 99214 OFFICE O/P EST MOD 30 MIN: CPT

## 2022-04-14 ENCOUNTER — LABORATORY RESULT (OUTPATIENT)
Age: 67
End: 2022-04-14

## 2022-04-22 ENCOUNTER — APPOINTMENT (OUTPATIENT)
Dept: INTERNAL MEDICINE | Facility: CLINIC | Age: 67
End: 2022-04-22
Payer: MEDICARE

## 2022-04-22 VITALS
RESPIRATION RATE: 12 BRPM | WEIGHT: 207 LBS | OXYGEN SATURATION: 98 % | HEIGHT: 69 IN | HEART RATE: 94 BPM | DIASTOLIC BLOOD PRESSURE: 69 MMHG | TEMPERATURE: 99.6 F | BODY MASS INDEX: 30.66 KG/M2 | SYSTOLIC BLOOD PRESSURE: 110 MMHG

## 2022-04-22 DIAGNOSIS — L30.9 DERMATITIS, UNSPECIFIED: ICD-10-CM

## 2022-04-22 PROCEDURE — 99213 OFFICE O/P EST LOW 20 MIN: CPT

## 2022-05-06 ENCOUNTER — RX RENEWAL (OUTPATIENT)
Age: 67
End: 2022-05-06

## 2022-05-23 NOTE — CONSULT NOTE ADULT - CONSULT REQUESTED DATE/TIME
Addended by: SCHOENMAN, ERIN N on: 5/23/2022 09:59 AM     Modules accepted: Orders    
02-Nov-2021 12:08

## 2022-05-31 ENCOUNTER — APPOINTMENT (OUTPATIENT)
Dept: INTERNAL MEDICINE | Facility: CLINIC | Age: 67
End: 2022-05-31
Payer: MEDICARE

## 2022-05-31 VITALS
HEIGHT: 69 IN | RESPIRATION RATE: 12 BRPM | SYSTOLIC BLOOD PRESSURE: 129 MMHG | DIASTOLIC BLOOD PRESSURE: 75 MMHG | OXYGEN SATURATION: 98 % | WEIGHT: 205 LBS | HEART RATE: 78 BPM | BODY MASS INDEX: 30.36 KG/M2

## 2022-05-31 VITALS — SYSTOLIC BLOOD PRESSURE: 120 MMHG | DIASTOLIC BLOOD PRESSURE: 70 MMHG

## 2022-05-31 DIAGNOSIS — L03.019 CELLULITIS OF UNSPECIFIED FINGER: ICD-10-CM

## 2022-05-31 PROCEDURE — G0439: CPT

## 2022-05-31 PROCEDURE — G0444 DEPRESSION SCREEN ANNUAL: CPT | Mod: 59

## 2022-05-31 PROCEDURE — 36415 COLL VENOUS BLD VENIPUNCTURE: CPT

## 2022-05-31 PROCEDURE — 99213 OFFICE O/P EST LOW 20 MIN: CPT | Mod: 25

## 2022-05-31 PROCEDURE — G0402 INITIAL PREVENTIVE EXAM: CPT

## 2022-05-31 NOTE — HISTORY OF PRESENT ILLNESS
[de-identified] : Patient with coronary artery disease, hyperlipidemia returns to office for annual wellness exam\par .  He feels well and denies any chest pain, shortness of breath, or palpitation\par He states compliance with all of his medications\par He also complains of some redness and swelling of his right fourth finger and nailbed after pulling a cuticle.

## 2022-05-31 NOTE — PHYSICAL EXAM
[No Acute Distress] : no acute distress [Normal Sclera/Conjunctiva] : normal sclera/conjunctiva [PERRL] : pupils equal round and reactive to light [EOMI] : extraocular movements intact [Normal Oropharynx] : the oropharynx was normal [Normal TMs] : both tympanic membranes were normal [No Lymphadenopathy] : no lymphadenopathy [Thyroid Normal, No Nodules] : the thyroid was normal and there were no nodules present [No Carotid Bruits] : no carotid bruits [No Abdominal Bruit] : a ~M bruit was not heard ~T in the abdomen [No Edema] : there was no peripheral edema [Normal Appearance] : normal in appearance [No Masses] : no palpable masses [Normal] : no posterior cervical lymphadenopathy and no anterior cervical lymphadenopathy [No CVA Tenderness] : no CVA  tenderness [No Spinal Tenderness] : no spinal tenderness [Coordination Grossly Intact] : coordination grossly intact [No Focal Deficits] : no focal deficits [Normal Affect] : the affect was normal [Normal Insight/Judgement] : insight and judgment were intact [No Mass] : no mass [Prostate Tenderness] : the prostate was not tender [No Prostate Nodules] : no prostate nodules [Stool Occult Blood] : stool negative for occult blood [de-identified] : No suspicious skin

## 2022-05-31 NOTE — HEALTH RISK ASSESSMENT
[0] : 2) Feeling down, depressed, or hopeless: Not at all (0) [PHQ-2 Negative - No further assessment needed] : PHQ-2 Negative - No further assessment needed [Patient reported colonoscopy was normal] : Patient reported colonoscopy was normal [QCA5Vsbmg] : 0 [ColonoscopyComments] : 2-3 years ago

## 2022-06-01 ENCOUNTER — RX RENEWAL (OUTPATIENT)
Age: 67
End: 2022-06-01

## 2022-06-01 ENCOUNTER — LABORATORY RESULT (OUTPATIENT)
Age: 67
End: 2022-06-01

## 2022-06-01 LAB
ALBUMIN SERPL ELPH-MCNC: 4 G/DL
ALP BLD-CCNC: 93 U/L
ALT SERPL-CCNC: 17 U/L
ANION GAP SERPL CALC-SCNC: 14 MMOL/L
APPEARANCE: CLEAR
AST SERPL-CCNC: 17 U/L
BACTERIA: NEGATIVE
BASOPHILS # BLD AUTO: 0.07 K/UL
BASOPHILS NFR BLD AUTO: 1.1 %
BILIRUB SERPL-MCNC: 0.4 MG/DL
BILIRUBIN URINE: NEGATIVE
BLOOD URINE: NEGATIVE
BUN SERPL-MCNC: 18 MG/DL
CALCIUM SERPL-MCNC: 9.3 MG/DL
CHLORIDE SERPL-SCNC: 106 MMOL/L
CHOLEST SERPL-MCNC: 146 MG/DL
CO2 SERPL-SCNC: 23 MMOL/L
COLOR: YELLOW
CREAT SERPL-MCNC: 0.91 MG/DL
EGFR: 94 ML/MIN/1.73M2
EOSINOPHIL # BLD AUTO: 0.25 K/UL
EOSINOPHIL NFR BLD AUTO: 3.9 %
ESTIMATED AVERAGE GLUCOSE: 114 MG/DL
GLUCOSE QUALITATIVE U: NEGATIVE
GLUCOSE SERPL-MCNC: 100 MG/DL
HBA1C MFR BLD HPLC: 5.6 %
HCT VFR BLD CALC: 42.9 %
HDLC SERPL-MCNC: 43 MG/DL
HGB BLD-MCNC: 13.1 G/DL
HYALINE CASTS: 0 /LPF
IMM GRANULOCYTES NFR BLD AUTO: 0.3 %
KETONES URINE: NEGATIVE
LDLC SERPL CALC-MCNC: 82 MG/DL
LEUKOCYTE ESTERASE URINE: NEGATIVE
LYMPHOCYTES # BLD AUTO: 1.62 K/UL
LYMPHOCYTES NFR BLD AUTO: 25 %
MAN DIFF?: NORMAL
MCHC RBC-ENTMCNC: 29.5 PG
MCHC RBC-ENTMCNC: 30.5 GM/DL
MCV RBC AUTO: 96.6 FL
MICROSCOPIC-UA: NORMAL
MONOCYTES # BLD AUTO: 0.68 K/UL
MONOCYTES NFR BLD AUTO: 10.5 %
NEUTROPHILS # BLD AUTO: 3.85 K/UL
NEUTROPHILS NFR BLD AUTO: 59.2 %
NITRITE URINE: NEGATIVE
NONHDLC SERPL-MCNC: 103 MG/DL
PH URINE: 6.5
PLATELET # BLD AUTO: 241 K/UL
POTASSIUM SERPL-SCNC: 4.4 MMOL/L
PROT SERPL-MCNC: 6.2 G/DL
PROTEIN URINE: NORMAL
PSA SERPL-MCNC: 0.72 NG/ML
RBC # BLD: 4.44 M/UL
RBC # FLD: 14.4 %
RED BLOOD CELLS URINE: 2 /HPF
SODIUM SERPL-SCNC: 142 MMOL/L
SPECIFIC GRAVITY URINE: 1.03
SQUAMOUS EPITHELIAL CELLS: 1 /HPF
TRIGL SERPL-MCNC: 103 MG/DL
UROBILINOGEN URINE: NORMAL
WBC # FLD AUTO: 6.49 K/UL
WHITE BLOOD CELLS URINE: 2 /HPF

## 2022-06-09 ENCOUNTER — NON-APPOINTMENT (OUTPATIENT)
Age: 67
End: 2022-06-09

## 2022-06-09 ENCOUNTER — APPOINTMENT (OUTPATIENT)
Dept: CARDIOLOGY | Facility: CLINIC | Age: 67
End: 2022-06-09
Payer: MEDICARE

## 2022-06-09 VITALS
DIASTOLIC BLOOD PRESSURE: 80 MMHG | WEIGHT: 205 LBS | BODY MASS INDEX: 30.36 KG/M2 | HEIGHT: 69 IN | OXYGEN SATURATION: 96 % | HEART RATE: 81 BPM | SYSTOLIC BLOOD PRESSURE: 135 MMHG

## 2022-06-09 PROCEDURE — 99214 OFFICE O/P EST MOD 30 MIN: CPT

## 2022-06-09 PROCEDURE — 93000 ELECTROCARDIOGRAM COMPLETE: CPT

## 2022-06-09 NOTE — DISCUSSION/SUMMARY
[With Me] : with me [___ Month(s)] : in [unfilled] month(s) [FreeTextEntry1] : Mr. Burroughs is a 66 year old male with HLD, here for follow up.\par \par He recently had a cardiac catheterization demonstrating significant LAD and RCA disease.  He is now status post PCI to his LAD, and more recently s/p a partially successful attempt to open the  of his Lcx. He then returned to the lab, and is s/p POBA to his LCx with an excellent result.\par \par He has no worrisome symptoms of chest pain, and his prior symptoms of heartburn have resolved.\par \par He will remain on aspirin, Plavix, and Crestor.  He is already seen an improvement in his LDL to 82, and has lost weight.  He remains on toprol, though we are considering stopping this in the setting of psoriasis. His LV function has significantly improved (also noted were moderate LVH and MR). We will repeat his echo later this year.\par \par He will try to stay active, eat right, and lose weight.  He knows to call with any issues or concerns. There is no issue with him stopping his Plavix 5 days prior to dental work. I will see him again in 3-6 months.
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
## 2022-08-25 ENCOUNTER — APPOINTMENT (OUTPATIENT)
Dept: INTERNAL MEDICINE | Facility: CLINIC | Age: 67
End: 2022-08-25

## 2022-08-25 VITALS
DIASTOLIC BLOOD PRESSURE: 75 MMHG | SYSTOLIC BLOOD PRESSURE: 117 MMHG | WEIGHT: 198 LBS | TEMPERATURE: 99.2 F | BODY MASS INDEX: 29.33 KG/M2 | HEIGHT: 69 IN | HEART RATE: 95 BPM | RESPIRATION RATE: 12 BRPM | OXYGEN SATURATION: 97 %

## 2022-08-25 DIAGNOSIS — J40 BRONCHITIS, NOT SPECIFIED AS ACUTE OR CHRONIC: ICD-10-CM

## 2022-08-25 PROCEDURE — 99213 OFFICE O/P EST LOW 20 MIN: CPT

## 2022-08-26 LAB — SARS-COV-2 N GENE NPH QL NAA+PROBE: NOT DETECTED

## 2022-09-13 ENCOUNTER — NON-APPOINTMENT (OUTPATIENT)
Age: 67
End: 2022-09-13

## 2022-09-15 ENCOUNTER — NON-APPOINTMENT (OUTPATIENT)
Age: 67
End: 2022-09-15

## 2022-09-15 ENCOUNTER — APPOINTMENT (OUTPATIENT)
Dept: PULMONOLOGY | Facility: CLINIC | Age: 67
End: 2022-09-15

## 2022-09-15 DIAGNOSIS — U07.1 COVID-19: ICD-10-CM

## 2022-09-15 PROCEDURE — 99203 OFFICE O/P NEW LOW 30 MIN: CPT | Mod: CS,95

## 2022-09-15 RX ORDER — AMOXICILLIN AND CLAVULANATE POTASSIUM 875; 125 MG/1; MG/1
875-125 TABLET, COATED ORAL
Qty: 14 | Refills: 0 | Status: DISCONTINUED | COMMUNITY
Start: 2022-05-31 | End: 2022-09-15

## 2022-09-15 RX ORDER — CLARITHROMYCIN 500 MG/1
500 TABLET, FILM COATED ORAL
Qty: 20 | Refills: 0 | Status: DISCONTINUED | COMMUNITY
Start: 2022-08-25 | End: 2022-09-15

## 2022-09-15 NOTE — HISTORY OF PRESENT ILLNESS
[Home] : at home, [unfilled] , at the time of the visit. [Medical Office: ([facility])___] : at the medical office located in  [Verbal consent obtained from patient] : the patient, [unfilled] [FreeTextEntry1] : 67-year-old male referred to the [facility] program for initial telehealth, COVID infection.\par \par Patient is vaccinated x2, Moderna, last dose was in January 2021.\par \par His symptoms started on September 13, runny nose, head congestion.  Took a home test which was positive and confirmed it with a PCR yesterday.\par \par Overall patient's symptoms are mild.  He has some nasal congestion and a constant nasal drip, and some ear congestion.  There are few to no body aches and they are already improved.  He has no fever, no significant cough, no shortness of breath, no GI symptoms, taking in good fluids.\par \par She is in overall good general health.  He had an abnormal routine stress test last year, which led to a cath, which led to an LAD stent which was placed in 2021.  He is maintained on Plavix, low-dose aspirin, losartan, Crestor.  And he takes Pepcid.\par \par He is a former smoker who quit in 2010.  He is a  in [place].  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  He is nasally congested.  But he is speaking clearly and easily without any respiratory effort.  He is alert and oriented and appropriate in affect.\par \par COVID day #3 and a vaccinated times two 67-year-old male with coronary disease and stent in 2021.  Symptoms are mild and already somewhat improved.  However, given his risk factors I did offer treatment with Paxlovid, he has agreed to.  Patient was advised to discontinue his Crestor for the duration of treatment.  It is not necessary to stop his Plavix given that he is not within a close amount of time of his stents.  Side effects potentials discussed.  Patient will stay well-hydrated.  I expect him to make a full recovery.  He will call me, however, if any new or concerning symptoms develop.

## 2022-09-29 ENCOUNTER — NON-APPOINTMENT (OUTPATIENT)
Age: 67
End: 2022-09-29

## 2022-09-29 ENCOUNTER — APPOINTMENT (OUTPATIENT)
Dept: CARDIOLOGY | Facility: CLINIC | Age: 67
End: 2022-09-29

## 2022-09-29 VITALS
DIASTOLIC BLOOD PRESSURE: 62 MMHG | WEIGHT: 202 LBS | BODY MASS INDEX: 29.92 KG/M2 | HEART RATE: 84 BPM | HEIGHT: 69 IN | SYSTOLIC BLOOD PRESSURE: 98 MMHG | OXYGEN SATURATION: 97 %

## 2022-09-29 VITALS — DIASTOLIC BLOOD PRESSURE: 70 MMHG | SYSTOLIC BLOOD PRESSURE: 110 MMHG

## 2022-09-29 DIAGNOSIS — R07.89 OTHER CHEST PAIN: ICD-10-CM

## 2022-09-29 PROCEDURE — 99214 OFFICE O/P EST MOD 30 MIN: CPT

## 2022-09-29 PROCEDURE — 93000 ELECTROCARDIOGRAM COMPLETE: CPT
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
## 2022-09-29 NOTE — DISCUSSION/SUMMARY
[With Me] : with me [___ Month(s)] : in [unfilled] month(s) [FreeTextEntry1] : Mr. Burroughs is a 67 year old male with HLD, here for follow up.\par \par In 2021, he had a cardiac catheterization demonstrating significant LAD and RCA disease.  He is now status post PCI to his LAD, and more recently s/p a partially successful attempt to open the  of his Lcx. He then returned to the lab, and is s/p POBA to his LCx with an excellent result.\par \par He has no worrisome symptoms of chest pain, and his prior symptoms of heartburn have resolved.\par \par He will remain on aspirin, Plavix, and Crestor.  He is already seen an improvement in his LDL to 82, and has lost weight.  He stopped metoprolol because of psoriasis. His LV function has significantly improved (also noted were moderate LVH and MR). We will repeat his echo in December of 2022.\par \par He will try to stay active, eat right, and lose weight.  He knows to call with any issues or concerns. I will see him again in 3-6 months. [EKG obtained to assist in diagnosis and management of assessed problem(s)] : EKG obtained to assist in diagnosis and management of assessed problem(s)

## 2022-10-17 ENCOUNTER — APPOINTMENT (OUTPATIENT)
Dept: INTERNAL MEDICINE | Facility: CLINIC | Age: 67
End: 2022-10-17

## 2022-10-17 VITALS
SYSTOLIC BLOOD PRESSURE: 112 MMHG | WEIGHT: 205 LBS | TEMPERATURE: 98.9 F | DIASTOLIC BLOOD PRESSURE: 65 MMHG | OXYGEN SATURATION: 98 % | BODY MASS INDEX: 30.36 KG/M2 | RESPIRATION RATE: 12 BRPM | HEART RATE: 89 BPM | HEIGHT: 69 IN

## 2022-10-17 PROCEDURE — 99213 OFFICE O/P EST LOW 20 MIN: CPT

## 2022-11-07 NOTE — ASU PATIENT PROFILE, ADULT - PATIENT REPRESENTATIVE NAME
RN called and spoke to patient. Told patient that her insurance is not covering for her CT scan because Dr. Ramirez didn't order it. Patient expressed understanding. At the same time, RN told patient she will not need the appt with Dr. Ramirez tomorrow. RN will cancel the appt. RN reminded patient if she has recurring leg symptoms or worsening symptoms, we will always be happy to see her again. Patient expressed understanding.    Tim Flores RN     isaías

## 2022-11-29 ENCOUNTER — NON-APPOINTMENT (OUTPATIENT)
Age: 67
End: 2022-11-29

## 2022-11-29 ENCOUNTER — APPOINTMENT (OUTPATIENT)
Dept: OTOLARYNGOLOGY | Facility: CLINIC | Age: 67
End: 2022-11-29

## 2022-11-29 ENCOUNTER — RESULT REVIEW (OUTPATIENT)
Age: 67
End: 2022-11-29

## 2022-11-29 VITALS
BODY MASS INDEX: 29.92 KG/M2 | HEART RATE: 77 BPM | WEIGHT: 202 LBS | SYSTOLIC BLOOD PRESSURE: 131 MMHG | DIASTOLIC BLOOD PRESSURE: 79 MMHG | HEIGHT: 69 IN

## 2022-11-29 DIAGNOSIS — R05.9 COUGH, UNSPECIFIED: ICD-10-CM

## 2022-11-29 DIAGNOSIS — J38.4 EDEMA OF LARYNX: ICD-10-CM

## 2022-11-29 DIAGNOSIS — R06.00 DYSPNEA, UNSPECIFIED: ICD-10-CM

## 2022-11-29 DIAGNOSIS — G47.33 OBSTRUCTIVE SLEEP APNEA (ADULT) (PEDIATRIC): ICD-10-CM

## 2022-11-29 PROCEDURE — 70486 CT MAXILLOFACIAL W/O DYE: CPT

## 2022-11-29 PROCEDURE — 31231 NASAL ENDOSCOPY DX: CPT

## 2022-11-29 PROCEDURE — 99204 OFFICE O/P NEW MOD 45 MIN: CPT | Mod: 25

## 2022-11-29 NOTE — HISTORY OF PRESENT ILLNESS
[de-identified] : Patient states he feels like he feels like he is constantly getting sick since May. Patient states he develops green sputum, nasal congestion, and coughing. Patient states he frequently is given antibiotics but symptoms eventually come back. patient states he has been using atrovent nasal spray not consistently perhaps twice a day.  Patient states his gf states he stops breathing while sleeping.

## 2022-11-29 NOTE — REVIEW OF SYSTEMS
[Sneezing] : sneezing [Seasonal Allergies] : seasonal allergies [Post Nasal Drip] : post nasal drip [Nasal Congestion] : nasal congestion [Recurrent Sinus Infections] : recurrent sinus infections [Shortness Of Breath] : shortness of breath [Wheezing] : wheezing [Cough] : cough [Heartburn] : heartburn [Easy Bruising] : tendency for easy bruising [Negative] : Endocrine [FreeTextEntry7] : reflux [FreeTextEntry1] : skin rash

## 2022-11-29 NOTE — PROCEDURE
[Recalcitrant Symptoms] : recalcitrant symptoms  [None] : none [Flexible Endoscope] : examined with the flexible endoscope [FreeTextEntry6] : Procedure:  Flexible Fiberoptic Laryngoscopy: Risks, benefits, and alternatives of flexible endoscopy were explained to the patient.  The patient gave oral consent to proceed.  The flexible scope was inserted into the right nasal cavity and advanced towards the nasopharynx.  \par left side-\par deviated septum, large turbinate, middle turbinate scarred to the septum no polyps or growths\par right side-\par large middle turbinate, \par narrowing at soft palate and uvula\par lymphoid tissue at base of tongue\par Edema and erythema of the postcricoid, arytenoids and interarytenoids.\par vocal cords normal\par false cord bowing\par some pooling post cricoid\par

## 2022-11-29 NOTE — PHYSICAL EXAM
[Hearing Gilman Test (Tuning Fork On Forehead)] : no lateralization of tone [Midline] : trachea located in midline position [Normal] : no rashes [Hearing Loss Right Only] : normal [Hearing Loss Left Only] : normal [FreeTextEntry8] : cerumen removed via curettage [FreeTextEntry9] : cerumen removed via curettage [FreeTextEntry2] : Sinuses non tender

## 2022-11-29 NOTE — ASSESSMENT
[FreeTextEntry1] : Reviewed and reconciled medications, allergies, PMHx, PSHx, SocHx, FMHx.\par \par physical exam:\par type 3 oral cavity\par large floppy uvula\par thyroid slightly enlarged\par \par Procedure:  Flexible Fiberoptic Laryngoscopy: Risks, benefits, and alternatives of flexible endoscopy were explained to the patient.  The patient gave oral consent to proceed.  The flexible scope was inserted into the right nasal cavity and advanced towards the nasopharynx.  \par left side-\par deviated septum, large turbinate, middle turbinate scarred to the septum no polyps or growths\par right side-\par large middle turbinate, \par narrowing at soft palate and uvula\par lymphoid tissue at base of tongue\par Edema and erythema of the postcricoid, arytenoids and interarytenoids. With some pooling post cricoid\par vocal cords normal\par \par Ct sinus non con 11/29/22:\par \par maxillary: complete opacification worse on the left\par ethmoid: mild disease\par sphenoid: clear\par frontal: clear\par Deviated septum\par inferior Turbinate hypertrophy\par Middle turbinate greater on the right side\par fely bullosa \par \par Plan:\par Sleep study\par Ct scan of sinus\par FEEST\par Reflux diet sheet provided to patient.\par

## 2022-11-29 NOTE — CONSULT LETTER
[Dear  ___] : Dear  [unfilled], [Courtesy Letter:] : I had the pleasure of seeing your patient, [unfilled], in my office today. [Please see my note below.] : Please see my note below. [Sincerely,] : Sincerely, [FreeTextEntry3] : Hossein Disla MD FACS\par

## 2022-11-30 DIAGNOSIS — J34.89 OTHER SPECIFIED DISORDERS OF NOSE AND NASAL SINUSES: ICD-10-CM

## 2022-12-13 ENCOUNTER — APPOINTMENT (OUTPATIENT)
Dept: OTOLARYNGOLOGY | Facility: CLINIC | Age: 67
End: 2022-12-13

## 2022-12-20 ENCOUNTER — APPOINTMENT (OUTPATIENT)
Dept: OTOLARYNGOLOGY | Facility: CLINIC | Age: 67
End: 2022-12-20

## 2022-12-23 ENCOUNTER — APPOINTMENT (OUTPATIENT)
Dept: CARDIOLOGY | Facility: CLINIC | Age: 67
End: 2022-12-23

## 2022-12-23 PROCEDURE — 93306 TTE W/DOPPLER COMPLETE: CPT

## 2022-12-23 RX ORDER — PERFLUTREN 6.52 MG/ML
6.52 INJECTION, SUSPENSION INTRAVENOUS
Qty: 1 | Refills: 0 | Status: COMPLETED | OUTPATIENT
Start: 2022-12-23

## 2022-12-27 ENCOUNTER — RX RENEWAL (OUTPATIENT)
Age: 67
End: 2022-12-27

## 2022-12-28 ENCOUNTER — LABORATORY RESULT (OUTPATIENT)
Age: 67
End: 2022-12-28

## 2022-12-30 ENCOUNTER — APPOINTMENT (OUTPATIENT)
Dept: INTERNAL MEDICINE | Facility: CLINIC | Age: 67
End: 2022-12-30
Payer: MEDICARE

## 2022-12-30 ENCOUNTER — NON-APPOINTMENT (OUTPATIENT)
Age: 67
End: 2022-12-30

## 2022-12-30 VITALS
BODY MASS INDEX: 30.36 KG/M2 | OXYGEN SATURATION: 99 % | WEIGHT: 205 LBS | HEIGHT: 69 IN | SYSTOLIC BLOOD PRESSURE: 128 MMHG | DIASTOLIC BLOOD PRESSURE: 78 MMHG | HEART RATE: 94 BPM

## 2022-12-30 PROCEDURE — 99214 OFFICE O/P EST MOD 30 MIN: CPT | Mod: 25

## 2022-12-30 PROCEDURE — 93000 ELECTROCARDIOGRAM COMPLETE: CPT | Mod: 59

## 2022-12-30 RX ORDER — NIRMATRELVIR AND RITONAVIR 300-100 MG
20 X 150 MG & KIT ORAL
Qty: 30 | Refills: 0 | Status: DISCONTINUED | COMMUNITY
Start: 2022-09-15 | End: 2022-12-30

## 2022-12-30 RX ORDER — METOPROLOL SUCCINATE 25 MG/1
25 TABLET, EXTENDED RELEASE ORAL
Qty: 90 | Refills: 2 | Status: DISCONTINUED | COMMUNITY
Start: 2021-09-07 | End: 2022-12-30

## 2023-01-03 ENCOUNTER — NON-APPOINTMENT (OUTPATIENT)
Age: 68
End: 2023-01-03

## 2023-01-03 ENCOUNTER — APPOINTMENT (OUTPATIENT)
Dept: OTOLARYNGOLOGY | Facility: CLINIC | Age: 68
End: 2023-01-03
Payer: MEDICARE

## 2023-01-03 PROCEDURE — 92612 ENDOSCOPY SWALLOW (FEES) VID: CPT | Mod: GN

## 2023-01-03 NOTE — ASSESSMENT
[FreeTextEntry1] : REPORT OF EVALUATION OF SWALLOW FUNCTION VIA FLEXIBLE ENDOSCOPIC EXAMINATION OF SWALLOWING (FEES) \par \par Date of Evaluation: 1/3/23\par Patient Name: Yan Burroughs\par : 1955\par Diagnosis: Dysphagia, oropharyngeal R13.12\par Physician: Dr. Hossein Disla\par Type of Assessment: FEES\par Onset: Years \par \par History: Information on this patient has been provided by the patient and via chart review. Yan Burroughs is a 67 year old male who was referred by his otolaryngologst for a FEES to assess swallow function and rule out silent penetration/aspiration. \par \par Reason For Referral: Patient reports a history of chronic cough and sinus issues for several years which have been unsuccessfully treated with antibiotics. He denies dysphagia to solids/liquids and reportedly consumes a regualr diet of solid and thin fluids. Patient endorses a history of GERD for which he takes Famotidine. Futhermore, he denies  unintentional weight loss and/or recent PNA. \par \par Medical History, per charting:\par Active Problems\par Abnormal ECG (794.31) (R94.31)\par Abnormal electrocardiogram (794.31) (R94.31)\par Abnormal stress test (794.39) (R94.39)\par Acute lateral meniscal tear (836.1) (S83.289A)\par Benign essential hypertension (401.1) (I10)\par Bronchitis (490) (J40)\par CAD (coronary artery disease) (414.00) (I25.10)\par CAD (coronary artery disease), native coronary artery (414.01) (I25.10)\par Chest discomfort (786.59) (R07.89)\par Chest pain (786.50) (R07.9)\par Concussion, without loss of consciousness, initial encounter\par Cough (786.2) (R05.9)\par COVID-19 (079.89) (U07.1)\par Dermatitis (692.9) (L30.9)\par Dyspnea (786.09) (R06.00)\par Eczema (692.9) (L30.9)\par GERD (gastroesophageal reflux disease) (530.81) (K21.9)\par Greater trochanteric bursitis of right hip (726.5) (M70.61)\par HLD (hyperlipidemia) (272.4) (E78.5)\par Ischemic cardiomyopathy (414.8) (I25.5)\par Paronychia of finger (681.02) (L03.019)\par Prediabetes (790.29) (R73.03)\par Preop testing (V72.84) (Z01.818)\par Preop testing (V72.84) (Z01.818)\par Sinusitis (473.9) (J32.9)\par Synovial cyst of popliteal space (727.51) (M71.20)\par Systolic congestive heart failure, unspecified HF chronicity (428.20,428.0) (I50.20)\par Tearing of muscle (848.9) (T14.8XXA)\par URI (upper respiratory infection) (465.9) (J06.9)\par Vertigo (780.4) (R42)\par \par Current Respiratory Status: Room air\par Current Diet: Regular solids and thin fluids \par \par FEES ASSESSMENT \par Consistencies Administered: \par 1. Pureed via teaspoon\par 2. Solids, self-fed\par 3. Thin fluids via straw\par \par FEES PROCEDURE: \par For the purposes of today’s assessment, the patient is provided with pureed, regular solid and thin liquid textures impregnated in green. The nasendoscope was advanced via the right nare and placed superiorly to the supraglottic structures, with good visibility. The supraglottic structures appeared to be WNL. The vocal folds were fully mobile bilaterally. There was edema and erythema of the postcricoid, arytenoids and interarytenoids.\par Oral stages were WFL marked by adequate acceptance of bolus, lip closure and oral containment, with adequate lingual movement and cohesive bolus formation, recollection, and AP transport. Intermittent premature spillage the valleculae noted across solids. \par \par Pharyngeal stage was marked by a prompt swallow trigger initiation, adequate base of tongue retraction, adequate pharyngeal contractibility and +hyolaryngeal elevation with complete epiglottic closure. There was trace residue viewed in the posterior pharyngeal wall bilateral piriform sinuses which cleared with secondary/tertiary swallows. There was no penetration or aspiration pre or post swallow for puree, solids, and thin fluids. . In addition, evidence of LPR included return of  green-tinged material to the level of the pharyngoesophageal segment advancing to the pyriform sinuses. At this point, the scope was carefully removed and testing was discontinued, with the patient tolerating the procedure without difficulty. \par \par Aspiration - Penetration Scale: 1 - Pureed; Regular Solids; Thin Liquid\par \par Aspiration - Penetration Scale (Truek et al Dysphagia 11:93-98 (1996), Aspiration-Penetration Scale) \par 1. Material does not enter the airway \par 2. Material enters the airway, remains above the vocal folds, and is ejected from the airway \par 3. Material enters the airway, remains above the vocal folds, and is not ejected \par 4. Material enters the airway, contacts the vocal folds, and is ejected from the airway \par 5. Material enters the airway, contacts the vocal folds, and is not ejected from the airway \par 6. Material enters the airway, passes below the vocal folds and is ejected into the larynx or out of the airway \par 7. Material enters the airway, passes below the vocal folds, and is not ejected from the trachea despite effort \par 8. Material enters the airway, passes below the vocal folds, and no effort is made to eject \par \par IMPRESSIONS: Mild Pharyngeal Dysphagia with evidence of larygopharyngeal reflux \par \par RECOMMENDATIONS:\par 1) Continue a Regular Solid / Thin Liquid diet\par 2) Initiate Reflux Precautions and Lifestyle / Diet Modifications\par 3) Follow up with referring MD as directed\par \par EDUCATION: Verbal and written educational information were provided re: reflux precautions and lifestyle / diet modifications. The patient demonstrated full understanding for all the above. Should you have additional questions/concerns, please contact this office at [phone].\par \par Ami Biggs M.A., CCC-SLP\par Speech-Language Pathologist

## 2023-01-04 DIAGNOSIS — Z20.822 CONTACT WITH AND (SUSPECTED) EXPOSURE TO COVID-19: ICD-10-CM

## 2023-01-07 LAB — SARS-COV-2 N GENE NPH QL NAA+PROBE: NOT DETECTED

## 2023-01-10 ENCOUNTER — OUTPATIENT (OUTPATIENT)
Dept: OUTPATIENT SERVICES | Facility: HOSPITAL | Age: 68
LOS: 1 days | End: 2023-01-10
Payer: MEDICARE

## 2023-01-10 ENCOUNTER — TRANSCRIPTION ENCOUNTER (OUTPATIENT)
Age: 68
End: 2023-01-10

## 2023-01-10 VITALS
HEIGHT: 69 IN | WEIGHT: 203.93 LBS | RESPIRATION RATE: 16 BRPM | SYSTOLIC BLOOD PRESSURE: 132 MMHG | DIASTOLIC BLOOD PRESSURE: 73 MMHG | HEART RATE: 71 BPM | OXYGEN SATURATION: 98 % | TEMPERATURE: 98 F

## 2023-01-10 VITALS — TEMPERATURE: 98 F

## 2023-01-10 DIAGNOSIS — I25.10 ATHEROSCLEROTIC HEART DISEASE OF NATIVE CORONARY ARTERY WITHOUT ANGINA PECTORIS: ICD-10-CM

## 2023-01-10 DIAGNOSIS — Z95.5 PRESENCE OF CORONARY ANGIOPLASTY IMPLANT AND GRAFT: Chronic | ICD-10-CM

## 2023-01-10 LAB
ALBUMIN SERPL ELPH-MCNC: 4.1 G/DL — SIGNIFICANT CHANGE UP (ref 3.3–5)
ALP SERPL-CCNC: 88 U/L — SIGNIFICANT CHANGE UP (ref 40–120)
ALT FLD-CCNC: 20 U/L — SIGNIFICANT CHANGE UP (ref 10–45)
ANION GAP SERPL CALC-SCNC: 8 MMOL/L — SIGNIFICANT CHANGE UP (ref 5–17)
AST SERPL-CCNC: 21 U/L — SIGNIFICANT CHANGE UP (ref 10–40)
BILIRUB SERPL-MCNC: 0.2 MG/DL — SIGNIFICANT CHANGE UP (ref 0.2–1.2)
BUN SERPL-MCNC: 16 MG/DL — SIGNIFICANT CHANGE UP (ref 7–23)
CALCIUM SERPL-MCNC: 9.5 MG/DL — SIGNIFICANT CHANGE UP (ref 8.4–10.5)
CHLORIDE SERPL-SCNC: 108 MMOL/L — SIGNIFICANT CHANGE UP (ref 96–108)
CO2 SERPL-SCNC: 24 MMOL/L — SIGNIFICANT CHANGE UP (ref 22–31)
CREAT SERPL-MCNC: 0.83 MG/DL — SIGNIFICANT CHANGE UP (ref 0.5–1.3)
EGFR: 97 ML/MIN/1.73M2 — SIGNIFICANT CHANGE UP
GLUCOSE SERPL-MCNC: 93 MG/DL — SIGNIFICANT CHANGE UP (ref 70–99)
HCT VFR BLD CALC: 38.5 % — LOW (ref 39–50)
HGB BLD-MCNC: 12.5 G/DL — LOW (ref 13–17)
MCHC RBC-ENTMCNC: 28.9 PG — SIGNIFICANT CHANGE UP (ref 27–34)
MCHC RBC-ENTMCNC: 32.5 GM/DL — SIGNIFICANT CHANGE UP (ref 32–36)
MCV RBC AUTO: 89.1 FL — SIGNIFICANT CHANGE UP (ref 80–100)
NRBC # BLD: 0 /100 WBCS — SIGNIFICANT CHANGE UP (ref 0–0)
PLATELET # BLD AUTO: 263 K/UL — SIGNIFICANT CHANGE UP (ref 150–400)
POTASSIUM SERPL-MCNC: 4 MMOL/L — SIGNIFICANT CHANGE UP (ref 3.5–5.3)
POTASSIUM SERPL-SCNC: 4 MMOL/L — SIGNIFICANT CHANGE UP (ref 3.5–5.3)
PROT SERPL-MCNC: 7 G/DL — SIGNIFICANT CHANGE UP (ref 6–8.3)
RBC # BLD: 4.32 M/UL — SIGNIFICANT CHANGE UP (ref 4.2–5.8)
RBC # FLD: 12.8 % — SIGNIFICANT CHANGE UP (ref 10.3–14.5)
SODIUM SERPL-SCNC: 140 MMOL/L — SIGNIFICANT CHANGE UP (ref 135–145)
WBC # BLD: 8.13 K/UL — SIGNIFICANT CHANGE UP (ref 3.8–10.5)
WBC # FLD AUTO: 8.13 K/UL — SIGNIFICANT CHANGE UP (ref 3.8–10.5)

## 2023-01-10 PROCEDURE — 99152 MOD SED SAME PHYS/QHP 5/>YRS: CPT

## 2023-01-10 PROCEDURE — 93010 ELECTROCARDIOGRAM REPORT: CPT | Mod: 76

## 2023-01-10 PROCEDURE — C1887: CPT

## 2023-01-10 PROCEDURE — 93458 L HRT ARTERY/VENTRICLE ANGIO: CPT | Mod: 26,59

## 2023-01-10 PROCEDURE — C1874: CPT

## 2023-01-10 PROCEDURE — 92928 PRQ TCAT PLMT NTRAC ST 1 LES: CPT | Mod: LC

## 2023-01-10 PROCEDURE — C1725: CPT

## 2023-01-10 PROCEDURE — C1769: CPT

## 2023-01-10 PROCEDURE — C9600: CPT | Mod: LC

## 2023-01-10 PROCEDURE — 93458 L HRT ARTERY/VENTRICLE ANGIO: CPT | Mod: 59

## 2023-01-10 PROCEDURE — C1894: CPT

## 2023-01-10 PROCEDURE — 85027 COMPLETE CBC AUTOMATED: CPT

## 2023-01-10 PROCEDURE — 93005 ELECTROCARDIOGRAM TRACING: CPT

## 2023-01-10 PROCEDURE — 80053 COMPREHEN METABOLIC PANEL: CPT

## 2023-01-10 RX ORDER — SODIUM CHLORIDE 9 MG/ML
1000 INJECTION INTRAMUSCULAR; INTRAVENOUS; SUBCUTANEOUS
Refills: 0 | Status: DISCONTINUED | OUTPATIENT
Start: 2023-01-10 | End: 2023-01-24

## 2023-01-10 RX ORDER — LOSARTAN POTASSIUM 100 MG/1
25 TABLET, FILM COATED ORAL DAILY
Refills: 0 | Status: DISCONTINUED | OUTPATIENT
Start: 2023-01-10 | End: 2023-01-24

## 2023-01-10 RX ORDER — SODIUM CHLORIDE 9 MG/ML
3 INJECTION INTRAMUSCULAR; INTRAVENOUS; SUBCUTANEOUS EVERY 8 HOURS
Refills: 0 | Status: DISCONTINUED | OUTPATIENT
Start: 2023-01-10 | End: 2023-01-24

## 2023-01-10 RX ORDER — ASPIRIN/CALCIUM CARB/MAGNESIUM 324 MG
81 TABLET ORAL DAILY
Refills: 0 | Status: DISCONTINUED | OUTPATIENT
Start: 2023-01-10 | End: 2023-01-24

## 2023-01-10 RX ORDER — FAMOTIDINE 10 MG/ML
20 INJECTION INTRAVENOUS DAILY
Refills: 0 | Status: DISCONTINUED | OUTPATIENT
Start: 2023-01-10 | End: 2023-01-24

## 2023-01-10 RX ORDER — CLOPIDOGREL BISULFATE 75 MG/1
75 TABLET, FILM COATED ORAL DAILY
Refills: 0 | Status: DISCONTINUED | OUTPATIENT
Start: 2023-01-11 | End: 2023-01-24

## 2023-01-10 RX ORDER — CLOPIDOGREL BISULFATE 75 MG/1
300 TABLET, FILM COATED ORAL ONCE
Refills: 0 | Status: COMPLETED | OUTPATIENT
Start: 2023-01-10 | End: 2023-01-10

## 2023-01-10 RX ADMIN — CLOPIDOGREL BISULFATE 300 MILLIGRAM(S): 75 TABLET, FILM COATED ORAL at 14:30

## 2023-01-10 RX ADMIN — SODIUM CHLORIDE 3 MILLILITER(S): 9 INJECTION INTRAMUSCULAR; INTRAVENOUS; SUBCUTANEOUS at 14:26

## 2023-01-10 RX ADMIN — SODIUM CHLORIDE 75 MILLILITER(S): 9 INJECTION INTRAMUSCULAR; INTRAVENOUS; SUBCUTANEOUS at 15:30

## 2023-01-10 NOTE — ASU DISCHARGE PLAN (ADULT/PEDIATRIC) - NS MD DC FALL RISK RISK
For information on Fall & Injury Prevention, visit: [email or web address] OR  [email or web address] OR  https://www.cdc.gov/steadi/patient.html

## 2023-01-10 NOTE — H&P CARDIOLOGY - HISTORY OF PRESENT ILLNESS
67 year old male (no implantable devices) with PMHx of CAD with stents (pLAD in 5/2021 and  s/p POBA to LCx 10/2021, unsuccessful  lesion in RCA), HLD and GERD, LV dysfunction presents today for [facility]. Pt reports he had f/u with Dr. Tineo noticed that his Echocardiogram showed abnormal and referred for cath. Pt denies chest pain, SOB or fatigue.     As per Dr. Tineo's note: Echocardiogram with a decline in LV function (he had a normal EF reported in 2021, though done at [facility], with a study prior to this demonstrating an EF in 30's).       Card: Dr. JOE Tineo       < from: Limited Transthoracic Echo (10.27.21 @ 17:23) >  **Limited study performed for assessment of pericardial effusion**  1. Severe segmental left ventricular systolic dysfunction.  2. Normal pericardium with no pericardial effusion.  *** Compared with echocardiogram of 8/12/2021, no significant changes noted. EF 30-35% < end of copied text >    < from: Cardiac Catheterization (10.27.21 @ 10:48) >  Conclusions:   Elective  PCI to LCx (re-attempt) + RCA. 8F RFA and 7F RRA access.   Previous LCx  partially successful (August) 2021) now healed with faint antegrade microchannel.  200 wired  subintimally (old tract) and Gladius Mongo KW did not  re-enter distally. Mamba MC brought to distal OM and  200 was  able to be redirected into distal true lumen. IVUS  confirmeddistal true lumen and mid to proximal sub-intimal. 2.0mm and  3.0mm NC POBA performed with resultant AKASH 3 flow  (no stents deployed). Attention turned to RCA .  200 deflected  by mid RCA calcium and entered proximal ?RV  marginal branch which resulted in contained hematoma. No pericardial  effusion on bedside echo. 50mg protamine reversal and  procedure aborted. Recovered to holding hemodynamically stable and  asymptomatic.    Summary:   1. LCx  successful wire-cross + successful 3.0mm NC POBA (no stents deployed)  2. RCA  unsuccessful wire-cross Recommendations:   Repeat limited transthoracic echocardiogram 4-6 hours. Consider RCA  PCI re-attempt after 6-8 weeks. < end of copied text >

## 2023-01-10 NOTE — H&P CARDIOLOGY - NSICDXPASTMEDICALHX_GEN_ALL_CORE_FT
PAST MEDICAL HISTORY:  CAD (coronary artery disease)     Chest tube in place     Concussion     Former smoker     GERD (gastroesophageal reflux disease)     HLD (hyperlipidemia)     Pneumothorax

## 2023-01-10 NOTE — ASU PATIENT PROFILE, ADULT - FALL HARM RISK - UNIVERSAL INTERVENTIONS
Bed in lowest position, wheels locked, appropriate side rails in place/Call bell, personal items and telephone in reach/Instruct patient to call for assistance before getting out of bed or chair/Non-slip footwear when patient is out of bed/[place] to call system/Physically safe environment - no spills, clutter or unnecessary equipment/Purposeful Proactive Rounding/Room/bathroom lighting operational, light cord in reach

## 2023-01-10 NOTE — ASU DISCHARGE PLAN (ADULT/PEDIATRIC) - CARE PROVIDER_API CALL
Familia Tineo)  Cardiovascular Disease; Internal Medicine  [address], [place] [id number]  Phone: [phone]  Fax: [phone]  Established Patient  Follow Up Time: 2 weeks
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
## 2023-01-10 NOTE — H&P CARDIOLOGY - NSICDXFAMILYHX_GEN_ALL_CORE_FT
FAMILY HISTORY:  Father  Still living? No  FH: sudden death, Age at diagnosis: Age Unknown    Mother  Still living? No  FH: heart attack, Age at diagnosis: Age Unknown  FH: type 2 diabetes, Age at diagnosis: Age Unknown    Sibling  Still living? No  Family history of respiratory failure, Age at diagnosis: Age Unknown

## 2023-01-11 PROBLEM — I25.10 ATHEROSCLEROTIC HEART DISEASE OF NATIVE CORONARY ARTERY WITHOUT ANGINA PECTORIS: Chronic | Status: ACTIVE | Noted: 2023-01-10

## 2023-01-11 PROBLEM — Z87.891 PERSONAL HISTORY OF NICOTINE DEPENDENCE: Chronic | Status: ACTIVE | Noted: 2023-01-10

## 2023-01-19 ENCOUNTER — APPOINTMENT (OUTPATIENT)
Dept: OTOLARYNGOLOGY | Facility: AMBULATORY MEDICAL SERVICES | Age: 68
End: 2023-01-19

## 2023-01-20 ENCOUNTER — APPOINTMENT (OUTPATIENT)
Dept: CARDIOLOGY | Facility: CLINIC | Age: 68
End: 2023-01-20
Payer: MEDICARE

## 2023-01-20 ENCOUNTER — NON-APPOINTMENT (OUTPATIENT)
Age: 68
End: 2023-01-20

## 2023-01-20 VITALS
OXYGEN SATURATION: 97 % | SYSTOLIC BLOOD PRESSURE: 115 MMHG | BODY MASS INDEX: 30.36 KG/M2 | HEIGHT: 69 IN | WEIGHT: 205 LBS | HEART RATE: 86 BPM | DIASTOLIC BLOOD PRESSURE: 72 MMHG

## 2023-01-20 PROCEDURE — 99214 OFFICE O/P EST MOD 30 MIN: CPT

## 2023-01-20 PROCEDURE — 93000 ELECTROCARDIOGRAM COMPLETE: CPT
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
## 2023-01-20 NOTE — DISCUSSION/SUMMARY
[With Me] : with me [___ Month(s)] : in [unfilled] month(s) [FreeTextEntry1] : Mr. Burroughs is a 67 year old male with HLD, here for follow up.\par \par In 2021, he had a cardiac catheterization demonstrating significant LAD and RCA disease.  He is now status post PCI to his LAD, and POBA to his LCx with an excellent result, at this time.\par \par A repeat echocardiogram demonstrated a decline in his LV function, and I sent him back for cardiac catheterization this month.  This again re- demonstrated severe left circumflex disease, and he is now status post stent placement.\par \par He is doing well today, and I am going to repeat an echocardiogram in 3 months, to reevaluate his LV function.  He is following with Dr. Miller next month, to evaluate intervention on the  of his RCA.\par \par He will remain on aspirin, Plavix, and Crestor.  His most recent LDL on record was 82, and we will see where this settles, prior to adding Zetia.  He will remain on losartan, though I am going to try to switch him to Entresto.  He has been off the beta-blocker because of significant psoriasis.\par \par He will try to stay active, eat right, and lose weight.  I will see him again in 2 months.

## 2023-01-23 ENCOUNTER — APPOINTMENT (OUTPATIENT)
Dept: CARDIOLOGY | Facility: CLINIC | Age: 68
End: 2023-01-23

## 2023-01-27 ENCOUNTER — APPOINTMENT (OUTPATIENT)
Dept: OTOLARYNGOLOGY | Facility: CLINIC | Age: 68
End: 2023-01-27

## 2023-01-30 ENCOUNTER — APPOINTMENT (OUTPATIENT)
Dept: OTOLARYNGOLOGY | Facility: CLINIC | Age: 68
End: 2023-01-30
Payer: MEDICARE

## 2023-01-30 VITALS
DIASTOLIC BLOOD PRESSURE: 73 MMHG | SYSTOLIC BLOOD PRESSURE: 122 MMHG | HEART RATE: 86 BPM | BODY MASS INDEX: 30.96 KG/M2 | HEIGHT: 69 IN | WEIGHT: 209 LBS

## 2023-01-30 DIAGNOSIS — J34.2 DEVIATED NASAL SEPTUM: ICD-10-CM

## 2023-01-30 DIAGNOSIS — J32.9 CHRONIC SINUSITIS, UNSPECIFIED: ICD-10-CM

## 2023-01-30 DIAGNOSIS — J31.0 CHRONIC RHINITIS: ICD-10-CM

## 2023-01-30 PROCEDURE — 99213 OFFICE O/P EST LOW 20 MIN: CPT

## 2023-01-30 NOTE — ADDENDUM
[FreeTextEntry1] : Documented by Marisela Mayorga acting as scribe for Dr. Disla on 01/30/2023.\par \par All Medical record entries made by the scribe were at my, Dr. Disla,direction and personally dictated by me on 01/30/2023. I have reviewed the chart and agree that the record accurately reflects my personal performance of the history, physical exam, assessment and plan. I have also personally directed, reviewed, and agreed with the discharge instructions.

## 2023-01-30 NOTE — CONSULT LETTER
[Dear  ___] : Dear  [unfilled], [Courtesy Letter:] : I had the pleasure of seeing your patient, [unfilled], in my office today. [Please see my note below.] : Please see my note below. [Consult Closing:] : Thank you very much for allowing me to participate in the care of this patient.  If you have any questions, please do not hesitate to contact me. [Sincerely,] : Sincerely, [FreeTextEntry3] : Hossein Disla MD FACS

## 2023-01-30 NOTE — ASSESSMENT
[FreeTextEntry1] : Reviewed and reconciled medications, allergies, PMHx, PSHx, SocHx, FMHx.\par \par Pt presents with h/o reflux presents today c/o recurrent sinus infections presents today to discuss in office sinus procedure and test results. Pt notes he doesn't have an infections right now and his sinuses are clear. Pt notes he had a stent put in 1/10/23 and needs to be on blood thinners for at least 6 months. 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 Until pt is off of blood thinners, surgery isn't recommended and sinus infections will be treated as they come. Sinus rinse - 1 bottle per day or [place]. Flonase - 2 sprays bilaterally QD, spray laterally. More the 50% of time was spent counseling the patient.

## 2023-01-30 NOTE — HISTORY OF PRESENT ILLNESS
[de-identified] : Pt presents with h/o reflux presents today c/o recurrent sinus infections presents today to discuss in office sinus procedure and test results. Pt notes he doesn't have an infections right now and his sinuses are clear. Pt notes he had a stent put in 1/10/23 and needs to be on blood thinner for at least 6 months. Pt was scheduled for surgery with Dr. JUAREZ for 1/19/23.

## 2023-02-06 ENCOUNTER — TRANSCRIPTION ENCOUNTER (OUTPATIENT)
Age: 68
End: 2023-02-06

## 2023-02-15 ENCOUNTER — APPOINTMENT (OUTPATIENT)
Dept: CARDIOLOGY | Facility: CLINIC | Age: 68
End: 2023-02-15
Payer: MEDICARE

## 2023-02-15 VITALS
SYSTOLIC BLOOD PRESSURE: 109 MMHG | WEIGHT: 204 LBS | BODY MASS INDEX: 30.21 KG/M2 | OXYGEN SATURATION: 98 % | HEIGHT: 69 IN | HEART RATE: 97 BPM | DIASTOLIC BLOOD PRESSURE: 75 MMHG

## 2023-02-15 DIAGNOSIS — I51.9 HEART DISEASE, UNSPECIFIED: ICD-10-CM

## 2023-02-15 DIAGNOSIS — R07.9 CHEST PAIN, UNSPECIFIED: ICD-10-CM

## 2023-02-15 DIAGNOSIS — R94.39 ABNORMAL RESULT OF OTHER CARDIOVASCULAR FUNCTION STUDY: ICD-10-CM

## 2023-02-15 PROCEDURE — 99215 OFFICE O/P EST HI 40 MIN: CPT

## 2023-02-15 PROCEDURE — 93000 ELECTROCARDIOGRAM COMPLETE: CPT

## 2023-02-15 RX ORDER — FLUTICASONE PROPIONATE 50 UG/1
50 SPRAY, METERED NASAL
Qty: 1 | Refills: 6 | Status: DISCONTINUED | COMMUNITY
Start: 2022-09-15 | End: 2023-02-15

## 2023-02-15 RX ORDER — IRON/IRON ASP GLY/FA/MV-MIN 38 125-25-1MG
TABLET ORAL
Refills: 0 | Status: DISCONTINUED | COMMUNITY
End: 2023-02-15

## 2023-03-06 PROBLEM — R07.9 CHEST PAIN: Status: ACTIVE | Noted: 2018-11-29

## 2023-03-06 PROBLEM — R94.39 ABNORMAL STRESS TEST: Status: ACTIVE | Noted: 2021-05-03

## 2023-03-06 PROBLEM — I51.9 LV DYSFUNCTION: Status: ACTIVE | Noted: 2023-01-03

## 2023-03-06 NOTE — DISCUSSION/SUMMARY
[FreeTextEntry1] : 1. Discussed re-attempt of RCA  PCI with the patient at today's visit including indications and potential benefits. He will notify us when he would like to proceed.\par 2. C/w current management in the interim.\par 3. He will continue to follow up with Dr. Tineo.\par 4. Follow up after  PCI procedure. [EKG obtained to assist in diagnosis and management of assessed problem(s)] : EKG obtained to assist in diagnosis and management of assessed problem(s)

## 2023-03-06 NOTE — CARDIOLOGY SUMMARY
[de-identified] : 2/15/23 - SR. [de-identified] : 05/19/2021 - pLAD Anurag 4.0x38mm \par 08/12/2021 - LCx  successful wire cross, no stents deployed \par 01/12/2023 - PCI to LCx lesion with 3.0 x 38 mm Anurag Josephine SENIA.

## 2023-03-06 NOTE — HISTORY OF PRESENT ILLNESS
[FreeTextEntry1] : Mr Burroughs is a 67 year old man with HLD who has been seeing Dr GLADYS Tineo for severe coronary disease. Earlier this year he had exertional angina and investigations showed LV dysfunction (LVEF 28%) and inferior and lateral ischemia. Angiogram May 2021 confirmed severe 3 vessel disease featuring LCx and RCA CTOs. Proximal LAD 80% stenosis was treated with 1 SENIA ([place] 4.0x38mm). He returned for  PCI of his LCx in August 2021 which was partially successful (no stents deployed). He is now s/p POBA to his LCx with a good result on 10/27/21. There was a brief attempt at the  of his RCA, though unsuccessful. He subsequently returned on 1/10/23 and underwent PCI with stenting to proximal LCx lesion with a 3.0 x 38 mm [place] SENIA.  Since then he has been doing well. He does not experience angina and does not report any exertional symptoms.

## 2023-03-06 NOTE — ASSESSMENT
[FreeTextEntry1] : Abnormal ECG (794.31) (R94.31)\par CAD (coronary artery disease) (414.00) (I25.10)

## 2023-03-11 ENCOUNTER — NON-APPOINTMENT (OUTPATIENT)
Age: 68
End: 2023-03-11

## 2023-03-31 ENCOUNTER — NON-APPOINTMENT (OUTPATIENT)
Age: 68
End: 2023-03-31

## 2023-03-31 ENCOUNTER — APPOINTMENT (OUTPATIENT)
Dept: CARDIOLOGY | Facility: CLINIC | Age: 68
End: 2023-03-31
Payer: MEDICARE

## 2023-03-31 VITALS
BODY MASS INDEX: 31.1 KG/M2 | HEIGHT: 69 IN | DIASTOLIC BLOOD PRESSURE: 67 MMHG | HEART RATE: 91 BPM | OXYGEN SATURATION: 97 % | SYSTOLIC BLOOD PRESSURE: 110 MMHG | WEIGHT: 210 LBS

## 2023-03-31 PROCEDURE — 93000 ELECTROCARDIOGRAM COMPLETE: CPT

## 2023-03-31 PROCEDURE — 99214 OFFICE O/P EST MOD 30 MIN: CPT
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
## 2023-03-31 NOTE — DISCUSSION/SUMMARY
[With Me] : with me [___ Month(s)] : in [unfilled] month(s) [FreeTextEntry1] : Mr. Burroughs is a 67 year old male with HLD, here for follow up.\par \par In 2021, he had a cardiac catheterization demonstrating significant LAD and RCA disease.  He is now status post PCI to his LAD, and POBA to his LCx with an excellent result, at this time.\par \par At then end of 2022, a repeat echocardiogram demonstrated a decline in his LV function, and I sent him back for cardiac catheterization.  This again re- demonstrated severe left circumflex disease, and he is now status post stent placement.\par \par He is doing well today, and I am going to repeat an echocardiogram, to reevaluate his LV function.  He is following with Dr. Miller to evaluate intervention on the  of his RCA.\par \par He will remain on aspirin, Plavix, and Crestor.  His most recent LDL on record was 82, and we will see where this settles next week, prior to adding Zetia.  He will remain on losartan, though I am going to try again to switch him to Entresto.  He has been off the beta-blocker because of significant psoriasis.\par \par He will try to stay active, eat right, and lose weight.  I will see him again in 3 months. [EKG obtained to assist in diagnosis and management of assessed problem(s)] : EKG obtained to assist in diagnosis and management of assessed problem(s)

## 2023-04-07 LAB
ALBUMIN SERPL ELPH-MCNC: 4.6 G/DL
ALP BLD-CCNC: 98 U/L
ALT SERPL-CCNC: 22 U/L
ANION GAP SERPL CALC-SCNC: 11 MMOL/L
AST SERPL-CCNC: 23 U/L
BASOPHILS # BLD AUTO: 0.06 K/UL
BASOPHILS NFR BLD AUTO: 0.7 %
BILIRUB SERPL-MCNC: 0.3 MG/DL
BUN SERPL-MCNC: 17 MG/DL
CALCIUM SERPL-MCNC: 10 MG/DL
CHLORIDE SERPL-SCNC: 108 MMOL/L
CHOLEST SERPL-MCNC: 163 MG/DL
CO2 SERPL-SCNC: 24 MMOL/L
CREAT SERPL-MCNC: 0.89 MG/DL
EGFR: 95 ML/MIN/1.73M2
EOSINOPHIL # BLD AUTO: 0.21 K/UL
EOSINOPHIL NFR BLD AUTO: 2.5 %
ESTIMATED AVERAGE GLUCOSE: 123 MG/DL
GLUCOSE SERPL-MCNC: 90 MG/DL
HBA1C MFR BLD HPLC: 5.9 %
HCT VFR BLD CALC: 42 %
HDLC SERPL-MCNC: 43 MG/DL
HGB BLD-MCNC: 13 G/DL
IMM GRANULOCYTES NFR BLD AUTO: 0.2 %
LDLC SERPL CALC-MCNC: 91 MG/DL
LYMPHOCYTES # BLD AUTO: 1.76 K/UL
LYMPHOCYTES NFR BLD AUTO: 20.8 %
MAN DIFF?: NORMAL
MCHC RBC-ENTMCNC: 27 PG
MCHC RBC-ENTMCNC: 31 GM/DL
MCV RBC AUTO: 87.3 FL
MONOCYTES # BLD AUTO: 0.93 K/UL
MONOCYTES NFR BLD AUTO: 11 %
NEUTROPHILS # BLD AUTO: 5.47 K/UL
NEUTROPHILS NFR BLD AUTO: 64.8 %
NONHDLC SERPL-MCNC: 120 MG/DL
NT-PROBNP SERPL-MCNC: 177 PG/ML
PLATELET # BLD AUTO: 291 K/UL
POTASSIUM SERPL-SCNC: 5 MMOL/L
PROT SERPL-MCNC: 7 G/DL
RBC # BLD: 4.81 M/UL
RBC # FLD: 15 %
SODIUM SERPL-SCNC: 142 MMOL/L
TRIGL SERPL-MCNC: 142 MG/DL
TSH SERPL-ACNC: 1.96 UIU/ML
WBC # FLD AUTO: 8.45 K/UL

## 2023-04-13 ENCOUNTER — APPOINTMENT (OUTPATIENT)
Dept: CARDIOLOGY | Facility: CLINIC | Age: 68
End: 2023-04-13
Payer: MEDICARE

## 2023-04-13 PROCEDURE — 93306 TTE W/DOPPLER COMPLETE: CPT

## 2023-04-13 RX ORDER — PERFLUTREN 6.52 MG/ML
6.52 INJECTION, SUSPENSION INTRAVENOUS
Qty: 1 | Refills: 0 | Status: COMPLETED | OUTPATIENT
Start: 2023-04-13

## 2023-04-13 RX ADMIN — PERFLUTREN MG/ML: 6.52 INJECTION, SUSPENSION INTRAVENOUS at 00:00

## 2023-04-14 ENCOUNTER — RX RENEWAL (OUTPATIENT)
Age: 68
End: 2023-04-14

## 2023-05-07 ENCOUNTER — RX RENEWAL (OUTPATIENT)
Age: 68
End: 2023-05-07

## 2023-05-31 ENCOUNTER — APPOINTMENT (OUTPATIENT)
Dept: CARDIOLOGY | Facility: CLINIC | Age: 68
End: 2023-05-31

## 2023-05-31 RX ORDER — LOSARTAN POTASSIUM 25 MG/1
25 TABLET, FILM COATED ORAL DAILY
Qty: 90 | Refills: 2 | Status: DISCONTINUED | COMMUNITY
Start: 2021-09-07 | End: 2023-05-31

## 2023-05-31 NOTE — ASSESSMENT
[FreeTextEntry1] : 65 y/o male with PMH of HTN, CAD - prior stents to pLAD , there was a decline in Ef~35% on a TTE done on 12/2022, so Dr Tineo Recommends for a intervention on his known RCA .\par \par pt states he a Veg based dye allergy to a CT of Retina x years ago & reaction was coughing, has never been treated prior to [facility] x 3, case d/w Dr Metzgerj & now will not treat the allergy.\par Discussion was had with the patient that he/she will need to be NPO for 6 hours prior to scheduled procedure.  Patient verbalized understanding of above and all pre procedural questions answered.\par

## 2023-06-09 ENCOUNTER — APPOINTMENT (OUTPATIENT)
Dept: CARDIOLOGY | Facility: CLINIC | Age: 68
End: 2023-06-09

## 2023-06-09 NOTE — HISTORY OF PRESENT ILLNESS
[FreeTextEntry1] : 67 year old male (no implantable devices) with PMHx of CAD with stents (s/p PCI pLAD in 5/13/2021 and  s/p POBA to LCx  10/2021- unsuccessful attempt to open  lesion in RCA), HLD and GERD, LV dysfunction presents is scheduled for staged  of the RCA on 6/12 (changed from 6/8). Patient denies any reportable symptoms since Jan 2023. Follows with Dr Tineo, compliant with all meds\par \par As per Dr. Tineo's note: Echocardiogram with a decline in LV function (he had a normal EF reported in 2021, though done at [facility], with a study prior to this demonstrating an EF in 30's). \par \par \par Card: Dr. JOE Tineo \par

## 2023-06-09 NOTE — DISCUSSION/SUMMARY
[FreeTextEntry1] : 67 year old male (no implantable devices) with PMHx of CAD with stents (s/p PCI pLAD in 5/13/2021 and  s/p POBA to LCx  10/2021- unsuccessful attempt to open  lesion in RCA), HLD and GERD, LV dysfunction presents is scheduled for staged  of the RCA on 6/12 (changed from 6/8).\par Discussion was had with the patient that he/she will need to be NPO for 6 hours prior to scheduled procedure.  Patient verbalized understanding of above and all pre procedural questions answered.\par patient instructed to take all of his medications that he would take in the AM with sips of water

## 2023-06-09 NOTE — REASON FOR VISIT
[Home] : at home, [unfilled] , at the time of the educational consult. [Medical Office: ([facility])___] : at the medical office located in

## 2023-06-12 ENCOUNTER — OUTPATIENT (OUTPATIENT)
Dept: OUTPATIENT SERVICES | Facility: HOSPITAL | Age: 68
LOS: 1 days | End: 2023-06-12
Payer: MEDICARE

## 2023-06-12 ENCOUNTER — TRANSCRIPTION ENCOUNTER (OUTPATIENT)
Age: 68
End: 2023-06-12

## 2023-06-12 VITALS
RESPIRATION RATE: 16 BRPM | DIASTOLIC BLOOD PRESSURE: 87 MMHG | HEART RATE: 89 BPM | SYSTOLIC BLOOD PRESSURE: 144 MMHG | OXYGEN SATURATION: 99 % | TEMPERATURE: 98 F

## 2023-06-12 VITALS
TEMPERATURE: 98 F | HEART RATE: 77 BPM | DIASTOLIC BLOOD PRESSURE: 75 MMHG | WEIGHT: 212.08 LBS | OXYGEN SATURATION: 97 % | SYSTOLIC BLOOD PRESSURE: 142 MMHG | RESPIRATION RATE: 16 BRPM | HEIGHT: 69 IN

## 2023-06-12 DIAGNOSIS — I25.10 ATHEROSCLEROTIC HEART DISEASE OF NATIVE CORONARY ARTERY WITHOUT ANGINA PECTORIS: ICD-10-CM

## 2023-06-12 DIAGNOSIS — Z95.5 PRESENCE OF CORONARY ANGIOPLASTY IMPLANT AND GRAFT: Chronic | ICD-10-CM

## 2023-06-12 LAB
ALBUMIN SERPL ELPH-MCNC: 4.4 G/DL — SIGNIFICANT CHANGE UP (ref 3.3–5)
ALP SERPL-CCNC: 87 U/L — SIGNIFICANT CHANGE UP (ref 40–120)
ALT FLD-CCNC: 25 U/L — SIGNIFICANT CHANGE UP (ref 10–45)
ANION GAP SERPL CALC-SCNC: 13 MMOL/L — SIGNIFICANT CHANGE UP (ref 5–17)
AST SERPL-CCNC: 23 U/L — SIGNIFICANT CHANGE UP (ref 10–40)
BILIRUB SERPL-MCNC: 0.4 MG/DL — SIGNIFICANT CHANGE UP (ref 0.2–1.2)
BLD GP AB SCN SERPL QL: NEGATIVE — SIGNIFICANT CHANGE UP
BUN SERPL-MCNC: 20 MG/DL — SIGNIFICANT CHANGE UP (ref 7–23)
CALCIUM SERPL-MCNC: 9.8 MG/DL — SIGNIFICANT CHANGE UP (ref 8.4–10.5)
CHLORIDE SERPL-SCNC: 106 MMOL/L — SIGNIFICANT CHANGE UP (ref 96–108)
CO2 SERPL-SCNC: 22 MMOL/L — SIGNIFICANT CHANGE UP (ref 22–31)
CREAT SERPL-MCNC: 0.91 MG/DL — SIGNIFICANT CHANGE UP (ref 0.5–1.3)
EGFR: 93 ML/MIN/1.73M2 — SIGNIFICANT CHANGE UP
GLUCOSE SERPL-MCNC: 94 MG/DL — SIGNIFICANT CHANGE UP (ref 70–99)
HCT VFR BLD CALC: 38.8 % — LOW (ref 39–50)
HGB BLD-MCNC: 12.2 G/DL — LOW (ref 13–17)
MCHC RBC-ENTMCNC: 26.6 PG — LOW (ref 27–34)
MCHC RBC-ENTMCNC: 31.4 GM/DL — LOW (ref 32–36)
MCV RBC AUTO: 84.7 FL — SIGNIFICANT CHANGE UP (ref 80–100)
NRBC # BLD: 0 /100 WBCS — SIGNIFICANT CHANGE UP (ref 0–0)
PLATELET # BLD AUTO: 263 K/UL — SIGNIFICANT CHANGE UP (ref 150–400)
POTASSIUM SERPL-MCNC: 4.1 MMOL/L — SIGNIFICANT CHANGE UP (ref 3.5–5.3)
POTASSIUM SERPL-SCNC: 4.1 MMOL/L — SIGNIFICANT CHANGE UP (ref 3.5–5.3)
PROT SERPL-MCNC: 7.2 G/DL — SIGNIFICANT CHANGE UP (ref 6–8.3)
RBC # BLD: 4.58 M/UL — SIGNIFICANT CHANGE UP (ref 4.2–5.8)
RBC # FLD: 15.5 % — HIGH (ref 10.3–14.5)
RH IG SCN BLD-IMP: POSITIVE — SIGNIFICANT CHANGE UP
SODIUM SERPL-SCNC: 141 MMOL/L — SIGNIFICANT CHANGE UP (ref 135–145)
WBC # BLD: 8.45 K/UL — SIGNIFICANT CHANGE UP (ref 3.8–10.5)
WBC # FLD AUTO: 8.45 K/UL — SIGNIFICANT CHANGE UP (ref 3.8–10.5)

## 2023-06-12 PROCEDURE — 86901 BLOOD TYPING SEROLOGIC RH(D): CPT

## 2023-06-12 PROCEDURE — 93010 ELECTROCARDIOGRAM REPORT: CPT | Mod: 77

## 2023-06-12 PROCEDURE — C9607: CPT | Mod: RC

## 2023-06-12 PROCEDURE — 93005 ELECTROCARDIOGRAM TRACING: CPT

## 2023-06-12 PROCEDURE — C1725: CPT

## 2023-06-12 PROCEDURE — 86850 RBC ANTIBODY SCREEN: CPT

## 2023-06-12 PROCEDURE — 86900 BLOOD TYPING SEROLOGIC ABO: CPT

## 2023-06-12 PROCEDURE — 92978 ENDOLUMINL IVUS OCT C 1ST: CPT | Mod: RC

## 2023-06-12 PROCEDURE — C1760: CPT

## 2023-06-12 PROCEDURE — 92943 PRQ TRLUML REVSC CH OCC ANT: CPT | Mod: RC

## 2023-06-12 PROCEDURE — C1894: CPT

## 2023-06-12 PROCEDURE — 93010 ELECTROCARDIOGRAM REPORT: CPT

## 2023-06-12 PROCEDURE — C1874: CPT

## 2023-06-12 PROCEDURE — 85027 COMPLETE CBC AUTOMATED: CPT

## 2023-06-12 PROCEDURE — 92978 ENDOLUMINL IVUS OCT C 1ST: CPT | Mod: 26,RC

## 2023-06-12 PROCEDURE — 99152 MOD SED SAME PHYS/QHP 5/>YRS: CPT

## 2023-06-12 PROCEDURE — C1769: CPT

## 2023-06-12 PROCEDURE — 80053 COMPREHEN METABOLIC PANEL: CPT

## 2023-06-12 PROCEDURE — C1887: CPT

## 2023-06-12 RX ORDER — SODIUM CHLORIDE 9 MG/ML
1000 INJECTION INTRAMUSCULAR; INTRAVENOUS; SUBCUTANEOUS
Refills: 0 | Status: DISCONTINUED | OUTPATIENT
Start: 2023-06-12 | End: 2023-06-26

## 2023-06-12 RX ORDER — SACUBITRIL AND VALSARTAN 24; 26 MG/1; MG/1
1 TABLET, FILM COATED ORAL
Refills: 0 | DISCHARGE

## 2023-06-12 RX ORDER — ROSUVASTATIN CALCIUM 5 MG/1
1 TABLET ORAL
Refills: 0 | DISCHARGE

## 2023-06-12 RX ORDER — FAMOTIDINE 10 MG/ML
1 INJECTION INTRAVENOUS
Qty: 0 | Refills: 0 | DISCHARGE

## 2023-06-12 RX ORDER — ASPIRIN/CALCIUM CARB/MAGNESIUM 324 MG
1 TABLET ORAL
Qty: 0 | Refills: 0 | DISCHARGE

## 2023-06-12 RX ORDER — FAMOTIDINE 10 MG/ML
1 INJECTION INTRAVENOUS
Refills: 0 | DISCHARGE

## 2023-06-12 RX ORDER — ROSUVASTATIN CALCIUM 5 MG/1
1 TABLET ORAL
Qty: 0 | Refills: 0 | DISCHARGE

## 2023-06-12 RX ORDER — ASPIRIN/CALCIUM CARB/MAGNESIUM 324 MG
81 TABLET ORAL ONCE
Refills: 0 | Status: COMPLETED | OUTPATIENT
Start: 2023-06-12 | End: 2023-06-12

## 2023-06-12 RX ADMIN — SODIUM CHLORIDE 75 MILLILITER(S): 9 INJECTION INTRAMUSCULAR; INTRAVENOUS; SUBCUTANEOUS at 12:30

## 2023-06-12 RX ADMIN — SODIUM CHLORIDE 75 MILLILITER(S): 9 INJECTION INTRAMUSCULAR; INTRAVENOUS; SUBCUTANEOUS at 08:21

## 2023-06-12 RX ADMIN — Medication 81 MILLIGRAM(S): at 07:42

## 2023-06-12 NOTE — ASU DISCHARGE PLAN (ADULT/PEDIATRIC) - ASU DC SPECIAL INSTRUCTIONSFT
Wound Care:   the day AFTER your procedure remove bandage GENTLY, and clean using  mild soap and gentle warm, water stream, pat dry. leave OPEN to air. YOU MAY SHOWER   DO NOT apply lotions, creams, ointments, powder, perfumes to your incision site  DO NOT SOAK your site for 1 week ( no baths, no pools, no tubs, etc...)  Check  your groin and /or wrist daily.A small amount of bruising, and soarness are normal    ACTIVITY: for 24 hours   - DO NOT DRIVE  - DO NOT make any important decisions or sign legal documents   - DO NOT operate heavy machineries   - you may resume sexual activity in 48 hours, unless otherwise instructed by your cardiologist     If your procedure was done through the WRIST: for the NEXT 3DAYS:  - avoid pushing, pulling, with that affected wrist   - avoid repeated movement of that hand and wrist ( eg: typing, hammering)  - DO NOT LIFT anything more than 5 lbs     If your procedure was done through the GROIN: for the NEXT 5 DAYS  - Limit climbing stairs, DO NOT soak in bathtub or pool  - no strenuous activities, pushing, pulling, straining  - Do not lift anything 10lbs or heavier     MEDICATION:   take your medications as explained ( see discharge paperwork)   If you received a STENT, you will be taking antiplatelet medications to KEEP YOUR STENT OPEN ( eg: Aspirin, Plavix, Brilinta, Effient, etc).  Take as prescribed DO NOT STOP taking them without consulting with your cardiologist first.     Follow heart healthy diet recommended by your doctor, , if you smoke STOP SMOKING ( may call [phone] for center of tobacco control if you need assistance)     CALL your doctor to make appointment in 2 WEEKS     ***CALL YOUR DOCTOR***  if you experience: fever, chills, body aches, or severe pain, swelling, redness, heat or yellow discharge at incision site  If you experience Bleeding or excruciating pain at the procedural site, swelling ( golf ball size) at your procedural site  If you experience CHEST PAIN  If you experience extremity numbness, tingling, temperature change ( of your procedural site)   If you are unable to reach your doctor, you may contact:   -Cardiology Office at [facility] at [phone] or   - [facility] [phone]  - [facility] [phone]

## 2023-06-12 NOTE — ASU DISCHARGE PLAN (ADULT/PEDIATRIC) - NS MD DC FALL RISK RISK
For information on Fall & Injury Prevention, visit: [email or web address] OR  [email or web address] OR  https://www.cdc.gov/steadi/patient.html

## 2023-06-12 NOTE — H&P CARDIOLOGY - HISTORY OF PRESENT ILLNESS
67 year old male (no implantable devices) with PMHx of CAD with stents (pLAD in 5/2021 and  s/p POBA to LCx 10/2021, unsuccessful  lesion in RCA), HLD and GERD, LV dysfunction presents today for [facility]. Pt reports he had f/u with Dr. Tineo noticed that his Echocardiogram showed abnormal and referred for cath. Pt denies chest pain, SOB or fatigue.     As per Dr. Tineo's note: Echocardiogram with a decline in LV function (he had a normal EF reported in 2021, though done at [facility], with a study prior to this demonstrating an EF in 30's).       Card: Dr. JOE Tineo       < from: Limited Transthoracic Echo (10.27.21 @ 17:23) >  **Limited study performed for assessment of pericardial effusion**  1. Severe segmental left ventricular systolic dysfunction.  2. Normal pericardium with no pericardial effusion.  *** Compared with echocardiogram of 8/12/2021, no significant changes noted. EF 30-35% < end of copied text >    < from: Cardiac Catheterization (10.27.21 @ 10:48) >  Conclusions:   Elective  PCI to LCx (re-attempt) + RCA. 8F RFA and 7F RRA access.   Previous LCx  partially successful (August) 2021) now healed with faint antegrade microchannel.  200 wired  subintimally (old tract) and Gladius Mongo KW did not  re-enter distally. Mamba MC brought to distal OM and  200 was  able to be redirected into distal true lumen. IVUS  confirmeddistal true lumen and mid to proximal sub-intimal. 2.0mm and  3.0mm NC POBA performed with resultant AKASH 3 flow  (no stents deployed). Attention turned to RCA .  200 deflected  by mid RCA calcium and entered proximal ?RV  marginal branch which resulted in contained hematoma. No pericardial  effusion on bedside echo. 50mg protamine reversal and  procedure aborted. Recovered to holding hemodynamically stable and  asymptomatic.    Summary:   1. LCx  successful wire-cross + successful 3.0mm NC POBA (no stents deployed)  2. RCA  unsuccessful wire-cross Recommendations:   Repeat limited transthoracic echocardiogram 4-6 hours. Consider RCA  PCI re-attempt after 6-8 weeks. < end of copied text > 67 year old male (no implantable devices) with PMH of CAD with stents (pLAD in 5/2021 and  s/p POBA to LCx 10/2021, unsuccessful  lesion in RCA), HLD and GERD, LV dysfunction, referred for [facility] Jan 2023 following an abnormal echo showing a decline in LV function, returns today for PCI of . Pt denies chest pain, SOB or fatigue.     < from: Cardiac Catheterization (01.10.23 @ 13:19) >  Cath Lab Report    Diagnostic Cardiologist:       Jason Miller MD   Interventional Cardiologist:   Jason Miller MD   Fellow:                        Farhan Whitfield MD   Fellow:                        Lorna Puckett, Fellow   Referring Physician:           Familia Tineo MD     Procedures Performed   Procedures:               1.    Arterial Access - Right Radial     2.    Diagnostic Coronary Angiography   3.    Left Heart Cath   4.    PCI: SENIA     Indications:               HFrEF   stab;Stable angina     Lab Visit Indication:  HFrEF   PCI Status:                elective     Conclusions:   1. Severe two vessel CAD involving 90% proximal LCx stenosis and RCA  .    2. Mild disease in LAD. Patent stent in proximal LAD with mild diffuse  ISR.  3. Successful PCI of proximalLCx lesion with a 3.0 x 38 mm [place]  Adamant SENIA (post-dilated to 3.25 mm in proximal stent  segment).   4. Would consider RCA  PCI if develops recurrent symptoms.   5. Recommend DAPT with aspirin and plavix.   6. Recommend aggressive risk factor modification and medical therapy  for CAD.    < end of copied text >          Card: Dr. JOE Tineo       < from: Limited Transthoracic Echo (10.27.21 @ 17:23) >  **Limited study performed for assessment of pericardial effusion**  1. Severe segmental left ventricular systolic dysfunction.  2. Normal pericardium with no pericardial effusion.  *** Compared with echocardiogram of 8/12/2021, no significant changes noted. EF 30-35% < end of copied text >    < from: Cardiac Catheterization (10.27.21 @ 10:48) >  Conclusions:   Elective  PCI to LCx (re-attempt) + RCA. 8F RFA and 7F RRA access.   Previous LCx  partially successful (August) 2021) now healed with faint antegrade microchannel.  200 wired  subintimally (old tract) and Gladius Mongo KW did not  re-enter distally. Mamba MC brought to distal OM and  200 was  able to be redirected into distal true lumen. IVUS  confirmeddistal true lumen and mid to proximal sub-intimal. 2.0mm and  3.0mm NC POBA performed with resultant AKASH 3 flow  (no stents deployed). Attention turned to RCA .  200 deflected  by mid RCA calcium and entered proximal ?RV  marginal branch which resulted in contained hematoma. No pericardial  effusion on bedside echo. 50mg protamine reversal and  procedure aborted. Recovered to holding hemodynamically stable and  asymptomatic.    Summary:   1. LCx  successful wire-cross + successful 3.0mm NC POBA (no stents deployed)  2. RCA  unsuccessful wire-cross Recommendations:   Repeat limited transthoracic echocardiogram 4-6 hours. Consider RCA  PCI re-attempt after 6-8 weeks. < end of copied text > 67 year old male (no implantable devices) with PMH of CAD with stents (pLAD in 5/2021 and  s/p POBA to LCx 10/2021, unsuccessful  lesion in RCA), HLD and GERD, LV dysfunction, referred for [facility] Jan 2023 following an abnormal echo showing a decline in LV function, returns today for PCI of . Pt denies chest pain, SOB or fatigue.     < from: Cardiac Catheterization (01.10.23 @ 13:19) >  Cath Lab Report    Diagnostic Cardiologist:       Jason Miller MD   Interventional Cardiologist:   Jason Miller MD   Fellow:                        Farhan Whitfield MD   Fellow:                        Lorna Puckett, Fellow   Referring Physician:           Familia Tineo MD     Procedures Performed   Procedures:               1.    Arterial Access - Right Radial     2.    Diagnostic Coronary Angiography   3.    Left Heart Cath   4.    PCI: SENIA     Indications:               HFrEF   stab;Stable angina     Lab Visit Indication:  HFrEF   PCI Status:                elective     Conclusions:   1. Severe two vessel CAD involving 90% proximal LCx stenosis and RCA  .    2. Mild disease in LAD. Patent stent in proximal LAD with mild diffuse  ISR.  3. Successful PCI of proximalLCx lesion with a 3.0 x 38 mm [place]  [place]ard SENIA (post-dilated to 3.25 mm in proximal stent  segment).   4. Would consider RCA  PCI if develops recurrent symptoms.   5. Recommend DAPT with aspirin and plavix.   6. Recommend aggressive risk factor modification and medical therapy  for CAD.    < end of copied text >    Card: Dr. JOE Tineo

## 2023-06-12 NOTE — ASU PATIENT PROFILE, ADULT - PRO INTERPRETER NEED 2
Pan Arrow  1949  71 y.o.  female    SUBJECTIVE:    Chief Complaint   Patient presents with    Dizziness     Pain behind ear, disorientation, dizziness       HPI   Pt here today for pain behind right ear. Pt states she has had intermittent pain in mastoid area right side for approx 2-3 months. Over past week, she has had almost constant pain,  radiates up around ear to parietal area and occ down into right upper arm. Pt frequently feels dizzy/off balance if pain becomes severe. Pt states pain will last 10-15 minutes then resolves spontaneously but may recur multiple times a day or not again for another 1-2 days. COPD-chronic, increased wheezing/congestion over past few weeks. compliant with current medictions. Depression-pt takes care of grandchildren, feeling more down/depressed over past few months-states she feels tired/grandchildren have been bringing home \"so many infections\"    PHQ Scores 11/18/2021 10/11/2021 7/8/2021 4/6/2021 11/12/2020 10/1/2020 7/17/2020   PHQ2 Score 5 2 2 2 0 2 2   PHQ9 Score 11 2 2 2 0 2 2     Interpretation of Total Score Depression Severity: 1-4 = Minimal depression, 5-9 = Mild depression, 10-14 = Moderate depression, 15-19 = Moderately severe depression, 20-27 = Severe depression     Current Outpatient Medications on File Prior to Visit   Medication Sig Dispense Refill    umeclidinium-vilanterol (ANORO ELLIPTA) 62.5-25 MCG/INH AEPB inhaler Inhale 1 puff into the lungs daily 1 each 5    traMADol (ULTRAM) 50 MG tablet Take 1 tablet by mouth 2 times daily as needed for Pain for up to 90 days.  60 tablet 2    traZODone (DESYREL) 100 MG tablet TAKE 1 OR 2 TABLETS BY MOUTH NIGHTLY AS NEEDED FOR SLEEP 60 tablet 5    tiZANidine (ZANAFLEX) 4 MG tablet TAKE 1 TABLET BY MOUTH EVERY 8 HOURS AS NEEDED (MUSCLE SPASM) 40 tablet 2    ipratropium-albuterol (DUONEB) 0.5-2.5 (3) MG/3ML SOLN nebulizer solution Take 3 mLs by nebulization 4 times daily 360 mL 0    albuterol sulfate HFA (VENTOLIN HFA) 108 (90 Base) MCG/ACT inhaler Inhale 2 puffs into the lungs every 6 hours as needed for Wheezing 1 each 5    busPIRone (BUSPAR) 5 MG tablet Take 1 tablet by mouth 3 times daily 90 tablet 0    buPROPion (WELLBUTRIN XL) 300 MG extended release tablet TAKE ONE TABLET BY MOUTH IN THE MORNING 30 tablet 3    atorvastatin (LIPITOR) 10 MG tablet Take 1 tablet by mouth daily 90 tablet 1    acetaminophen (TYLENOL) 325 MG tablet Take 2 tablets by mouth every 4 hours as needed for Pain or Fever (Patient not taking: Reported on 2021) 120 tablet 3     No current facility-administered medications on file prior to visit. No Known Allergies    Past Medical History:   Diagnosis Date    Aneurysm of abdominal aorta (HCC)     Asthma     Back ache     reason for taking tramadol    COPD (chronic obstructive pulmonary disease) ([facility] [address]     H/O echocardiogram 2020    EF 50-55%. Sclerotic, but non-stenotic aortic valve. Moderate AR. Mild TR, MR.     History of nuclear stress test 2020    EF 75%. This is a normal study.     Needs flu shot 2018       Past Surgical History:   Procedure Laterality Date    APPENDECTOMY      HYSTERECTOMY      MIDDLE EAR SURGERY         Social History     Socioeconomic History    Marital status: Single     Spouse name: None    Number of children: None    Years of education: None    Highest education level: None   Occupational History    None   Tobacco Use    Smoking status: Former Smoker     Packs/day: 1.00     Years: 30.00     Pack years: 30.00     Quit date: 2015     Years since quittin.9    Smokeless tobacco: Never Used   Vaping Use    Vaping Use: Never used   Substance and Sexual Activity    Alcohol use: No    Drug use: No    Sexual activity: Never   Other Topics Concern    None   Social History Narrative    None     Social Determinants of Health     Financial Resource Strain: Low Risk     Difficulty of Paying Living Expenses: Not hard at all   Food Insecurity: No Food Insecurity    Worried About [address] [place] [address] in the Last Year: Never true    Ran Out of Food in the Last Year: Never true   Transportation Needs:     Lack of Transportation (Medical): Not on file    Lack of Transportation (Non-Medical): Not on file   Physical Activity:     Days of Exercise per Week: Not on file    Minutes of Exercise per Session: Not on file   Stress:     Feeling of Stress : Not on file   Social Connections:     Frequency of Communication with Friends and Family: Not on file    Frequency of Social Gatherings with Friends and Family: Not on file    Attends Voodoo Services: Not on file    Active Member of [address] [place] [address] or Organizations: Not on file    Attends Club or Organization Meetings: Not on file    Marital Status: Not on file   Intimate Partner Violence:     Fear of Current or Ex-Partner: Not on file    Emotionally Abused: Not on file    Physically Abused: Not on file    Sexually Abused: Not on file   Housing Stability:     Unable to Pay for Housing in the Last Year: Not on file    Number of [place] in the Last Year: Not on file    Unstable Housing in the Last Year: Not on file       Review of Systems   Constitutional: Positive for fatigue. Negative for chills and fever. HENT: Negative. Respiratory: Positive for cough, shortness of breath and wheezing. Cardiovascular: Negative for leg swelling. Gastrointestinal: Negative for abdominal pain. Skin: Negative. Neurological: Positive for dizziness and headaches. Negative for tremors, syncope, speech difficulty and weakness. Psychiatric/Behavioral: Positive for dysphoric mood. Negative for self-injury, sleep disturbance and suicidal ideas. The patient is not nervous/anxious.         OBJECTIVE:    /62 (Site: Right Upper Arm, Position: Sitting, Cuff Size: Medium Adult)   Pulse 88   Temp 98.1 °F (36.7 °C) (Temporal)   Resp 16   Wt 134 lb (60.8 kg)   SpO2 95% BMI 27.06 kg/m²     Physical Exam  Vitals reviewed. Constitutional:       Appearance: Normal appearance. HENT:      Head: Normocephalic. Right Ear: External ear normal.      Left Ear: External ear normal.   Eyes:      Extraocular Movements: Extraocular movements intact. Cardiovascular:      Rate and Rhythm: Normal rate and regular rhythm. Heart sounds: Normal heart sounds. Pulmonary:      Effort: Pulmonary effort is normal.      Breath sounds: Wheezing present. Musculoskeletal:      Cervical back: Normal range of motion. Skin:     General: Skin is warm and dry. Neurological:      Mental Status: She is alert and oriented to person, place, and time. Psychiatric:         Mood and Affect: Mood is depressed. Speech: Speech normal.         Behavior: Behavior normal. Behavior is cooperative. Thought Content:  Thought content normal.         Judgment: Judgment normal.         Jonah Meeter:    Problem List        Respiratory    COPD exacerbation (HCC) - Primary         Steroid burst now, increase duonebs to q6 hours, ATB as directed, f/u in next few days if not improving          Relevant Medications    ipratropium-albuterol (DUONEB) 0.5-2.5 (3) MG/3ML SOLN nebulizer solution    albuterol sulfate HFA (VENTOLIN HFA) 108 (90 Base) MCG/ACT inhaler    umeclidinium-vilanterol (ANORO ELLIPTA) 62.5-25 MCG/INH AEPB inhaler    predniSONE (DELTASONE) 20 MG tablet    promethazine-dextromethorphan (PROMETHAZINE-DM) 6.25-15 MG/5ML syrup       Other    Positive depression screening    Relevant Orders    Positive Screen for Clinical Depression with a Documented Follow-up Plan  (Completed)    Mastoid pain, right     Stat CT now, cover with ATB          Relevant Orders    CT HEAD WO CONTRAST (Completed)    Dizziness    Relevant Orders    CT HEAD WO CONTRAST (Completed)    Current moderate episode of major depressive disorder without prior episode (HCC)     Add lexapro, Risks/benefits/SE reviewed, pt voices understanding  Reviewed sxs serotonin syndrome with pt and interventions needed/when to present to office or ER          Relevant Medications    buPROPion (WELLBUTRIN XL) 300 MG extended release tablet    busPIRone (BUSPAR) 5 MG tablet    traZODone (DESYREL) 100 MG tablet    escitalopram (LEXAPRO) 10 MG tablet               Return in about 2 months (around 1/18/2022). English

## 2023-06-12 NOTE — ASU PATIENT PROFILE, ADULT - FALL HARM RISK - UNIVERSAL INTERVENTIONS
Bed in lowest position, wheels locked, appropriate side rails in place/Call bell, personal items and telephone in reach/Instruct patient to call for assistance before getting out of bed or chair/Non-slip footwear when patient is out of bed/[place] to call system/Physically safe environment - no spills, clutter or unnecessary equipment/Purposeful Proactive Rounding/Room/bathroom lighting operational, light cord in reach

## 2023-06-12 NOTE — H&P CARDIOLOGY - NSICDXPASTMEDICALHX_GEN_ALL_CORE_FT
PAST MEDICAL HISTORY:  CAD (coronary artery disease)     Chest tube in place     Concussion     Former smoker     GERD (gastroesophageal reflux disease)     HLD (hyperlipidemia)     Pneumothorax      PAST MEDICAL HISTORY:  CAD (coronary artery disease)     Chest tube in place     Concussion     Former smoker     GERD (gastroesophageal reflux disease)     HLD (hyperlipidemia)     Pneumothorax     Psoriasis

## 2023-06-12 NOTE — ASU DISCHARGE PLAN (ADULT/PEDIATRIC) - CARE PROVIDER_API CALL
Familia Tineo  Cardiovascular Disease  [address], [place] [id number]  Phone: [phone]  Fax: [phone]  Follow Up Time:

## 2023-06-16 PROBLEM — L40.9 PSORIASIS, UNSPECIFIED: Chronic | Status: ACTIVE | Noted: 2023-06-12

## 2023-06-30 ENCOUNTER — NON-APPOINTMENT (OUTPATIENT)
Age: 68
End: 2023-06-30

## 2023-06-30 ENCOUNTER — APPOINTMENT (OUTPATIENT)
Dept: CARDIOLOGY | Facility: CLINIC | Age: 68
End: 2023-06-30
Payer: MEDICARE

## 2023-06-30 VITALS
BODY MASS INDEX: 41.62 KG/M2 | HEIGHT: 60 IN | WEIGHT: 212 LBS | DIASTOLIC BLOOD PRESSURE: 82 MMHG | SYSTOLIC BLOOD PRESSURE: 138 MMHG | HEART RATE: 71 BPM | OXYGEN SATURATION: 99 %

## 2023-06-30 PROCEDURE — 93000 ELECTROCARDIOGRAM COMPLETE: CPT

## 2023-06-30 PROCEDURE — 99214 OFFICE O/P EST MOD 30 MIN: CPT

## 2023-06-30 NOTE — DISCUSSION/SUMMARY
[With Me] : with me [___ Month(s)] : in [unfilled] month(s) [FreeTextEntry1] : Mr. Burroughs is a 68 year old male with HLD, here for follow up.\par \par In 2021, he had a cardiac catheterization demonstrating significant LAD and RCA disease.  He is now status post PCI to his LAD, and POBA to his LCx with an excellent result, at this time.\par \par At then end of 2022, a repeat echocardiogram demonstrated a decline in his LV function, and I sent him back for cardiac catheterization.  This again re- demonstrated severe left circumflex disease, and he is now status post stent placement.\par \par He is doing well today. His LV function has slightly improved, and his revascularization has been completed with PCI to the  of his RCA.\par \par He will remain on aspirin, Plavix, and Crestor.  His most recent LDL on record was 91, and I am adding Zetia.  He will take Entresto, which will be increased this summer. He has been off the beta-blocker because of significant psoriasis.\par \par He will try to stay active, eat right, and lose weight.  I will see him again in 3 months. [EKG obtained to assist in diagnosis and management of assessed problem(s)] : EKG obtained to assist in diagnosis and management of assessed problem(s)
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
## 2023-07-11 ENCOUNTER — NON-APPOINTMENT (OUTPATIENT)
Age: 68
End: 2023-07-11

## 2023-07-31 ENCOUNTER — RX RENEWAL (OUTPATIENT)
Age: 68
End: 2023-07-31

## 2023-09-04 ENCOUNTER — RX RENEWAL (OUTPATIENT)
Age: 68
End: 2023-09-04

## 2023-09-28 ENCOUNTER — NON-APPOINTMENT (OUTPATIENT)
Age: 68
End: 2023-09-28

## 2023-09-28 ENCOUNTER — APPOINTMENT (OUTPATIENT)
Dept: CARDIOLOGY | Facility: CLINIC | Age: 68
End: 2023-09-28
Payer: MEDICARE

## 2023-09-28 VITALS — SYSTOLIC BLOOD PRESSURE: 122 MMHG | DIASTOLIC BLOOD PRESSURE: 78 MMHG

## 2023-09-28 VITALS
HEIGHT: 60 IN | DIASTOLIC BLOOD PRESSURE: 70 MMHG | HEART RATE: 84 BPM | SYSTOLIC BLOOD PRESSURE: 105 MMHG | OXYGEN SATURATION: 97 % | BODY MASS INDEX: 41.62 KG/M2 | WEIGHT: 212 LBS

## 2023-09-28 PROCEDURE — 99214 OFFICE O/P EST MOD 30 MIN: CPT

## 2023-09-28 PROCEDURE — 93000 ELECTROCARDIOGRAM COMPLETE: CPT

## 2023-10-11 ENCOUNTER — RX RENEWAL (OUTPATIENT)
Age: 68
End: 2023-10-11

## 2023-10-30 ENCOUNTER — APPOINTMENT (OUTPATIENT)
Dept: INTERNAL MEDICINE | Facility: CLINIC | Age: 68
End: 2023-10-30
Payer: MEDICARE

## 2023-10-30 VITALS
WEIGHT: 214 LBS | DIASTOLIC BLOOD PRESSURE: 80 MMHG | HEART RATE: 82 BPM | OXYGEN SATURATION: 98 % | SYSTOLIC BLOOD PRESSURE: 120 MMHG | BODY MASS INDEX: 41.79 KG/M2

## 2023-10-30 DIAGNOSIS — I25.10 ATHEROSCLEROTIC HEART DISEASE OF NATIVE CORONARY ARTERY W/OUT ANGINA PECTORIS: ICD-10-CM

## 2023-10-30 DIAGNOSIS — M54.50 LOW BACK PAIN, UNSPECIFIED: ICD-10-CM

## 2023-10-30 PROCEDURE — 81003 URINALYSIS AUTO W/O SCOPE: CPT | Mod: QW

## 2023-10-30 PROCEDURE — 99214 OFFICE O/P EST MOD 30 MIN: CPT | Mod: 25

## 2023-10-31 LAB
APPEARANCE: CLEAR
BACTERIA: NEGATIVE /HPF
BILIRUB UR QL STRIP: NORMAL
BILIRUBIN URINE: NEGATIVE
BLOOD URINE: NEGATIVE
CAST: 0 /LPF
COLOR: YELLOW
EPITHELIAL CELLS: 0 /HPF
GLUCOSE QUALITATIVE U: NEGATIVE MG/DL
GLUCOSE UR-MCNC: NORMAL
HCG UR QL: NORMAL EU/DL
HGB UR QL STRIP.AUTO: ABNORMAL
KETONES UR-MCNC: NORMAL
KETONES URINE: NEGATIVE MG/DL
LEUKOCYTE ESTERASE UR QL STRIP: NORMAL
LEUKOCYTE ESTERASE URINE: NEGATIVE
MICROSCOPIC-UA: NORMAL
NITRITE UR QL STRIP: NORMAL
NITRITE URINE: NEGATIVE
PH UR STRIP: 7
PH URINE: 7
PROT UR STRIP-MCNC: NORMAL
PROTEIN URINE: NEGATIVE MG/DL
RED BLOOD CELLS URINE: 0 /HPF
SP GR UR STRIP: 1.02
SPECIFIC GRAVITY URINE: 1.01
UROBILINOGEN URINE: 0.2 MG/DL
WHITE BLOOD CELLS URINE: 0 /HPF

## 2023-11-02 NOTE — ASU PATIENT PROFILE, ADULT - HAVE YOU HAD COVID IN THE LAST 60 DAYS?
No Chemical Skin Burn    WHAT YOU NEED TO KNOW:    A chemical skin burn occurs when skin is damaged by chemicals. These chemicals may be found in cleaning products, , and pesticides. Chemicals may also be found in some workplaces, such as wet or dry cement or battery acid.     DISCHARGE INSTRUCTIONS:    Follow up with your healthcare provider or burn specialist in 1 day: Chemical burns may be worse than they appear at first. They may also get worse over the first few days. You may need regular follow-up visits until your burn heals. Write down your questions so you remember to ask them during your visits.    Medicines:   Pain medicine: Do not wait until the pain is severe before you take your medicine.  NSAIDs: These medicines decrease swelling, pain, and fever. NSAIDs are available without a doctor's order. Ask your healthcare provider which medicine is right for you, and how much to take. Take as directed. NSAIDs can cause stomach bleeding or kidney problems if not taken correctly.  Acetaminophen: This medicine decreases pain and fever. It is available without a doctor’s order. Ask how much to take and how often to take it. Follow directions. Acetaminophen can cause liver damage if not taken correctly.   Antibiotic cream: This medicine will help fight or prevent an infection caused by bacteria.  Anti-itching medicine: This medicine may help keep your burned skin from itching as it heals. It may be given as a pill or cream.  Take your medicine as directed. Contact your healthcare provider if you think your medicine is not helping or if you have side effects. Tell him of her if you are allergic to any medicine. Keep a list of the medicines, vitamins, and herbs you take. Include the amounts, and when and why you take them. Bring the list or the pill bottles to follow-up visits. Carry your medicine list with you in case of an emergency.    Early care of the burn area: Your burn will be covered with a bandage to keep it moist and clean. The bandage absorbs fluid that drains from the wound and helps prevent infection. Change your bandage as often as directed, and if it becomes soaked with fluid from the wound. You may need to change the bandage 2 times each day to start, and then once each week after that.    Later care of the burn area: Do the following after healthy skin covers the burn area:   Apply a moisturizer such as aloe vera cream to the burn area. This can help keep the skin moist and reduce itching. Loose, soft clothing can also help relieve itching.  Do not expose your wound to direct sunlight. For at least 12 months, apply sunblock to your wound every time you go outside during the day. Use a sunblock with an SPF of 25 or higher.  Follow instructions to help reduce scarring. Scars can limit your movement.    Prevent chemical skin burns:   Store cleaning products out of the reach of children. Read the safety information on the labels of household cleaning products before you use them.  Follow all safety rules in your workplace. Wear safety equipment such as gloves and goggles.     Contact your healthcare provider or burn specialist if:   You have a fever.  You have less energy and feel ill.  You have blisters that rupture.  You have questions or concerns about your condition or care.    Return to the emergency department if:   Your burn has more redness, pain, or swelling.  Your burn oozes yellow liquid that smells bad.  Your burned skin starts to tighten and restrict your movement.  Your burned skin changes color or a new wound develops.

## 2023-11-29 ENCOUNTER — NON-APPOINTMENT (OUTPATIENT)
Age: 68
End: 2023-11-29

## 2023-12-14 ENCOUNTER — RX RENEWAL (OUTPATIENT)
Age: 68
End: 2023-12-14

## 2023-12-19 ENCOUNTER — APPOINTMENT (OUTPATIENT)
Dept: CARDIOLOGY | Facility: CLINIC | Age: 68
End: 2023-12-19
Payer: MEDICARE

## 2023-12-19 ENCOUNTER — NON-APPOINTMENT (OUTPATIENT)
Age: 68
End: 2023-12-19

## 2023-12-19 VITALS
SYSTOLIC BLOOD PRESSURE: 135 MMHG | OXYGEN SATURATION: 97 % | HEART RATE: 84 BPM | DIASTOLIC BLOOD PRESSURE: 86 MMHG | WEIGHT: 218 LBS | BODY MASS INDEX: 32.29 KG/M2 | HEIGHT: 69 IN

## 2023-12-19 DIAGNOSIS — R94.31 ABNORMAL ELECTROCARDIOGRAM [ECG] [EKG]: ICD-10-CM

## 2023-12-19 PROCEDURE — 99214 OFFICE O/P EST MOD 30 MIN: CPT

## 2023-12-19 PROCEDURE — 93000 ELECTROCARDIOGRAM COMPLETE: CPT
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
## 2023-12-19 NOTE — DISCUSSION/SUMMARY
[With Me] : with me [___ Month(s)] : in [unfilled] month(s) [FreeTextEntry1] : Mr. Burroughs is a 68 year old male with HLD, here for follow up.  In 2021, he had a cardiac catheterization demonstrating significant LAD and RCA disease.  He is now status post PCI to his LAD, and POBA to his LCx with an excellent result, at this time.  At the end of 2022, a repeat echocardiogram demonstrated a decline in his LV function, and I sent him back for cardiac catheterization.  This again re- demonstrated severe left circumflex disease, and he is now status post stent placement.  He is doing well today. His LV function has slightly improved, and his revascularization has been completed with PCI to the  of his RCA. We will repeat an echocardiogram in 1/24 to evaluate this and his MR.  He will remain on aspirin, Plavix, and Crestor/Zetia.  His most recent LDL on record was 51.  He will continue Entresto. He has been off the beta-blocker because of significant psoriasis.  He will try to stay active, eat right, and lose weight.  He was anemic and will need an EGD/colonoscopy. He can stop his Plavix 5 days prior. There is no cardiac contraindication to proceeding with this, or dental work. I will see him again in 3 months. [EKG obtained to assist in diagnosis and management of assessed problem(s)] : EKG obtained to assist in diagnosis and management of assessed problem(s)

## 2024-01-01 NOTE — H&P CARDIOLOGY - NECK DETAILS
BIRTH HISTORY: Mary Wallace is 33.0 week AGA di-di twin A1 girl born 24 @1857 at [facility] via  to a 22yo -->1314 mom with blood type A+/ab neg, VDRL neg, GBS unknown/not sent, HepB/C neg, Rubella immune, GC/CT neg. PMD: Asthma, obesity, GERD, HSV. Meds: Acyclovir, betamethasone x 2, Acyclovir. Pregnancy complicated by di-di twins, and preeclampsia.  without labor for preeclampsia, ROM 0hrs clear, breech position of twin A. Apgars 2 at 1 minute, 8 at 5 minutes. Resuscitation: stim, suction, PPV, CPAP. To NICU on CPAP.    Placental Pathology: Placenta A: Lymphoplasmacytic deciduitis and focal basal villitis, Villous chorangiosis, Intervillous thrombus. 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 Mild respiratory acidosis, CXR c/w RDS. Weaned to nasal cannula . Neosynephrine drops -3 for bloody/swollen nose following difficult NG placements. To room air: 12/  On 12/10 with increased desaturations (XR, gas, RAP normal)--> placed back in NC. Weaned to room air on     CVS:   Access: PIV -  Murmur/Small Secundum ASD:  Follow up with Cardiology in 6 months - scheduled for : Cardiology Consult for murmur  : ECHO:    1. Normal cardiac segmental anatomy.   2. Trace mitral valve regurgitation.   3. Small secundum atrial septal defect, with left to right shunting.   4. Left ventricle is normal in size. Normal systolic function.   5. Qualitatively normal right ventricular size and normal systolic function.   6. No pericardial effusion.     FENGI:  Nutrition: Initial NPO on IV fluids, feeds started DOL 1 of  MBM/DBM - advanced and fortified. IV fluids off . Remained euglycemic. Full volume feeds: . NG removed: .   Vitamin D started:   Homegoing Feeds: MBM x4 feeds/day, Enfacare 24cal x4 feeds/day (or if no MBM available)    HEME/BILI:  Hyperbilirubinemia: Mom is A+/ab neg. Phototherapy -. Max TsB: 12.5/Dbili: 0.9. Last TsB: 5.7/Dbili 0.9 on     Anemia:   Last hematocrit: 30.6, Retic count: 2.9 ()  Iron started 12/3; increased to 5mg/kg/day on     ID: No sepsis evaluation    : Screening labs done secondary to significant desaturation events and needing FIO2/respiratory (from room air)  CRP: <0.1, CBC/Diff: reassuring, I.4%, no    RAP/COVID/Resp Viral panel: All negative. D    MUSCULOSKELETAL:   Breech: Hip US at 6 weeks corrected    DISCHARGE PLANNING / SCREENS:  Vitamin K:   Erythro Eye Ointment:   ONBS:  : all in range  Hearing Screen:  passed  HepB Vaccine #1: 24  Beyfortus:   Carseat Challenge:  passed  TFTs: >1500/11: TSH: 1.88, Free T4: 1.63 (WNL)--> protocol complete  CCHD: Pass 24  CPR Class: Encouraged/not taken  Preemie Class: Encouraged/not taken  PCP:  Bhupendra  Help-Me-Grow: Referral  Discharge Rx's: Mom requests Meds-2-Beds; Pedia-Poly-Deborah w/Iron, Oral Nystatin Rx sent   Dietary Teaching: Estefania gomez w/mom   WIC: Paperwork provided  Other Follow-Up Services: Cardiology    Discharge Physical Exam:    Weight: 2.765kg        Length: 46cm     Head Circumference: 34.75 cm    HEENT:   Mild dolichocephaly with approximated sutures. Anterior and posterior fontanelles are flat and soft. Normal quality, quantity, and distribution of scalp hair. Symmetrical face. Normal brows & lashes. Normal placement of eyes and straight fissures. The eyes are clear without redness or drainage. Well circumscribed pupil and red reflex (+) bilaterally. Nares patent. Mouth with symmetric movements. Lip & palate intact.  Ears are normal size, shape, and position. Well-curved pinnae soft and ready recoil. Ear canals appear patent. Neck supple without masses or webbing. Trachea midline. Bilateral periorbital edema. Mild thrush on tongue surface, not thick    Neuro:  Active alert with physical exam, positive grasp, gag, and suck reflexes. Equal [place] reflex. Appropriate muscle tone for gestational age. Symmetrical facial movement and cry with tongue midline.     RESP/Chest:  Bilateral breath sounds equal and clear, no grunting, flaring, or retractions. Chest is symmetrical. Nipples in appropriate position.    CVS:  Heart rate regular, murmur auscultated which is PPS quality at LUSB radiating to back and left axilla, PMI at lower left sternal border with quiet precordium, bilateral brachial and femoral pulses 2+ and equal. Capillary refill <3 seconds.      Skin:  No rashes, lesions, or bruises noted.  Mucous membrane and nail bed pink. Mildly pale    Abdomen:  Soft, non-tender, no palpable masses or organomegaly. Bowels sounds active x4 quadrants. Liver at right costal margin.     Genitourinary:  Normal appearance of female external genitalia    Musculoskeletal/Extremities:  Full ROM of all extremities. 10 fingers and 10 toes. Straight spine, no sacral dimple. Hips no clicks or clunks.   supple/no JVD

## 2024-01-05 ENCOUNTER — RX RENEWAL (OUTPATIENT)
Age: 69
End: 2024-01-05

## 2024-01-05 RX ORDER — CLOPIDOGREL BISULFATE 75 MG/1
75 TABLET, FILM COATED ORAL
Qty: 90 | Refills: 2 | Status: ACTIVE | COMMUNITY
Start: 2023-04-14 | End: 1900-01-01

## 2024-01-19 ENCOUNTER — APPOINTMENT (OUTPATIENT)
Dept: CARDIOLOGY | Facility: CLINIC | Age: 69
End: 2024-01-19
Payer: MEDICARE

## 2024-01-19 PROCEDURE — 93306 TTE W/DOPPLER COMPLETE: CPT

## 2024-01-19 RX ORDER — PERFLUTREN 6.52 MG/ML
6.52 INJECTION, SUSPENSION INTRAVENOUS
Qty: 1 | Refills: 0 | Status: COMPLETED | OUTPATIENT
Start: 2024-01-19

## 2024-01-19 RX ADMIN — PERFLUTREN MG/ML: 6.52 INJECTION, SUSPENSION INTRAVENOUS at 00:00

## 2024-01-22 ENCOUNTER — NON-APPOINTMENT (OUTPATIENT)
Age: 69
End: 2024-01-22

## 2024-01-26 ENCOUNTER — APPOINTMENT (OUTPATIENT)
Dept: INTERNAL MEDICINE | Facility: CLINIC | Age: 69
End: 2024-01-26
Payer: MEDICARE

## 2024-01-26 VITALS
TEMPERATURE: 98.5 F | OXYGEN SATURATION: 97 % | BODY MASS INDEX: 30.72 KG/M2 | WEIGHT: 208 LBS | DIASTOLIC BLOOD PRESSURE: 80 MMHG | SYSTOLIC BLOOD PRESSURE: 130 MMHG | HEART RATE: 82 BPM

## 2024-01-26 DIAGNOSIS — J18.9 PNEUMONIA, UNSPECIFIED ORGANISM: ICD-10-CM

## 2024-01-26 PROCEDURE — 99214 OFFICE O/P EST MOD 30 MIN: CPT

## 2024-01-26 RX ORDER — CLARITHROMYCIN 500 MG/1
500 TABLET, FILM COATED ORAL
Qty: 20 | Refills: 0 | Status: ACTIVE | COMMUNITY
Start: 2024-01-26 | End: 1900-01-01

## 2024-01-26 NOTE — PLAN
[FreeTextEntry1] : Follow-up in 10 to 14 days or sooner if necessary Advised follow-up with pulmonary

## 2024-01-26 NOTE — PHYSICAL EXAM
[No Acute Distress] : no acute distress [No Lymphadenopathy] : no lymphadenopathy [Normal Sclera/Conjunctiva] : normal sclera/conjunctiva [No Abdominal Bruit] : a ~M bruit was not heard ~T in the abdomen [No Palpable Aorta] : no palpable aorta [No Edema] : there was no peripheral edema [Normal] : soft, non-tender, non-distended, no masses palpated, no HSM and normal bowel sounds [No CVA Tenderness] : no CVA  tenderness [No Rash] : no rash [de-identified] : Positive Rales and diffuse rhonchi at the left base, no dullness

## 2024-01-26 NOTE — HISTORY OF PRESENT ILLNESS
[FreeTextEntry1] : Preoperative evaluation [de-identified] : Patient with history of coronary artery disease, hyperlipidemia presents to office for preoperative evaluation Preop chest x-ray reveals a left lower lobe opacity.  Patient does state that he has had a cough for several weeks which time his son contracted COVID-19 viral infection and he was also coughing for several days with yellow sputum.  At present he denies any shortness of breath, chest pains.  He denies any fever, chills.

## 2024-03-11 ENCOUNTER — RX RENEWAL (OUTPATIENT)
Age: 69
End: 2024-03-11

## 2024-04-01 ENCOUNTER — RX RENEWAL (OUTPATIENT)
Age: 69
End: 2024-04-01

## 2024-04-08 NOTE — ED PROVIDER NOTE - CPE EDP GASTRO NORM
Due to early-onset significant oligohydramnios of twin B, imaging is severely limited.  It is difficult to determine chorionicity of twins.  Additionally, anatomical imaging is difficult to obtain.  With known IUFD of twin a, and early-onset severe oligohydramnios of twin B, we do not anticipate this will be a viable pregnancy.    3/26/24- Imaging remains severely difficult with oligohydramnios of the living fetus.  She is concerned about leaking and was advised to go to triage for ROM. QUAD screen returned back with AFP of 38 MoMs (severely elevated)! I have discussed this case with two other colleagues and we will send new AFP and KB and recheck level. If still similar, will consider maternal imaging for further evaluation of AFP secreting conditions.     4/8/24- AFP repeated for comparison and decreased significantly from initial assessment likely representing fetal origin rather than maternal pathology. Imaging for Twin B remains severely limited due to low amniotic fluid.   normal...

## 2024-04-25 ENCOUNTER — NON-APPOINTMENT (OUTPATIENT)
Age: 69
End: 2024-04-25

## 2024-04-25 ENCOUNTER — APPOINTMENT (OUTPATIENT)
Dept: INTERNAL MEDICINE | Facility: CLINIC | Age: 69
End: 2024-04-25
Payer: MEDICARE

## 2024-04-25 VITALS
WEIGHT: 212 LBS | BODY MASS INDEX: 31.4 KG/M2 | HEART RATE: 84 BPM | SYSTOLIC BLOOD PRESSURE: 120 MMHG | HEIGHT: 69 IN | DIASTOLIC BLOOD PRESSURE: 80 MMHG | OXYGEN SATURATION: 98 %

## 2024-04-25 DIAGNOSIS — Z00.00 ENCOUNTER FOR GENERAL ADULT MEDICAL EXAMINATION W/OUT ABNORMAL FINDINGS: ICD-10-CM

## 2024-04-25 DIAGNOSIS — K21.9 GASTRO-ESOPHAGEAL REFLUX DISEASE W/OUT ESOPHAGITIS: ICD-10-CM

## 2024-04-25 PROCEDURE — G0439: CPT

## 2024-04-25 PROCEDURE — 36415 COLL VENOUS BLD VENIPUNCTURE: CPT

## 2024-04-25 PROCEDURE — 93000 ELECTROCARDIOGRAM COMPLETE: CPT | Mod: 59

## 2024-04-25 PROCEDURE — 99213 OFFICE O/P EST LOW 20 MIN: CPT | Mod: 25

## 2024-04-25 PROCEDURE — G0444 DEPRESSION SCREEN ANNUAL: CPT | Mod: 59

## 2024-04-25 RX ORDER — PANTOPRAZOLE 40 MG/1
40 TABLET, DELAYED RELEASE ORAL
Refills: 0 | Status: ACTIVE | COMMUNITY

## 2024-04-25 NOTE — ASSESSMENT
[FreeTextEntry1] : Stable Continue present medication Check routine blood work Advise regular aerobic exercise and heart healthy diet

## 2024-04-25 NOTE — HEALTH RISK ASSESSMENT
[Little interest or pleasure doing things] : 1) Little interest or pleasure doing things [Feeling down, depressed, or hopeless] : 2) Feeling down, depressed, or hopeless [0] : 2) Feeling down, depressed, or hopeless: Not at all (0) [PHQ-2 Negative - No further assessment needed] : PHQ-2 Negative - No further assessment needed [[id number]] : 0 [Patient reported colonoscopy was abnormal] : Patient reported colonoscopy was abnormal [ColonoscopyDate] : 1/2024 [ColonoscopyComments] : Positive polyp, or [place], advise follow-up in 5 years

## 2024-04-25 NOTE — PHYSICAL EXAM
[Normal Sclera/Conjunctiva] : normal sclera/conjunctiva [PERRL] : pupils equal round and reactive to light [EOMI] : extraocular movements intact [Normal Outer Ear/Nose] : the outer ears and nose were normal in appearance [Normal Oropharynx] : the oropharynx was normal [No Lymphadenopathy] : no lymphadenopathy [Thyroid Normal, No Nodules] : the thyroid was normal and there were no nodules present [No Carotid Bruits] : no carotid bruits [No Abdominal Bruit] : a ~M bruit was not heard ~T in the abdomen [No Varicosities] : no varicosities [No Edema] : there was no peripheral edema [No Palpable Aorta] : no palpable aorta [Normal Appearance] : normal in appearance [No Masses] : no palpable masses [No Mass] : no mass [Stool Occult Blood] : stool negative for occult blood [Scrotum] : the scrotum was normal [Testes Tenderness] : no tenderness of the testes [Testes Mass (___cm)] : there were no testicular masses [Prostate Tenderness] : the prostate was not tender [No Prostate Nodules] : no prostate nodules [Normal] : no posterior cervical lymphadenopathy and no anterior cervical lymphadenopathy [No CVA Tenderness] : no CVA  tenderness [No Spinal Tenderness] : no spinal tenderness [No Joint Swelling] : no joint swelling [No Rash] : no rash [Coordination Grossly Intact] : coordination grossly intact [No Focal Deficits] : no focal deficits [Normal Gait] : normal gait [Normal Affect] : the affect was normal [Normal Insight/Judgement] : insight and judgment were intact

## 2024-04-25 NOTE — HISTORY OF PRESENT ILLNESS
[de-identified] : Patient with history of hypertension, hyperlipidemia, prediabetes and coronary artery disease presents to office for annual wellness exam and routine follow-up. Overall he feels well and denies any exertional chest pain, shortness of breath, palpitations, polyuria or polydipsia. He just returned from a 1 week trip to [place] and he was active swimming on a daily basis in the pool without any symptoms
HIV+, chronic back pain, cervical cancer/dysplasia

## 2024-04-30 ENCOUNTER — APPOINTMENT (OUTPATIENT)
Dept: ORTHOPEDIC SURGERY | Facility: CLINIC | Age: 69
End: 2024-04-30
Payer: MEDICARE

## 2024-04-30 VITALS — WEIGHT: 210 LBS | BODY MASS INDEX: 31.1 KG/M2 | HEIGHT: 69 IN

## 2024-04-30 DIAGNOSIS — M65.4 RADIAL STYLOID TENOSYNOVITIS [DE QUERVAIN]: ICD-10-CM

## 2024-04-30 DIAGNOSIS — M25.532 PAIN IN LEFT WRIST: ICD-10-CM

## 2024-04-30 DIAGNOSIS — Z78.9 OTHER SPECIFIED HEALTH STATUS: ICD-10-CM

## 2024-04-30 PROCEDURE — 99203 OFFICE O/P NEW LOW 30 MIN: CPT | Mod: 25

## 2024-04-30 PROCEDURE — 20550 NJX 1 TENDON SHEATH/LIGAMENT: CPT | Mod: LT

## 2024-04-30 NOTE — PHYSICAL EXAM
[de-identified] : Left wrist: Swelling first extensor compartment. Marked tenderness.  Resisted thumb abduction extension with wrist in flexion causes considerable pain. Finkelstein test not notably painful. Ring finger A1 pulley nontender. With composite fist Ring finger locks and has very subtle triggering into extension without pain. There is no other A1 pulley tenderness or triggering in any other finger, left hand. No pertinent MP, PIP, or DIP joint contributory findings, except some Heberden's nodes; none are clinically painful.  Right wrist No tenderness first compartment Excellent motion. Right hand no A1 pulley tenderness and no triggering in any finger. No pertinent MP, PIP, or DIP joint contributory findings, except some Heberden's nodes; none are clinically painful.  Neurologic: Median, ulnar, and radial motor and sensory are intact.  Skin: No cyanosis, clubbing, or rashes. Vascular: Radial pulses intact. Lymphatic: No streaking or epitrochlear adenopathy. The patient is awake, alert, and oriented. Affect appropriate. Cooperative.
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
## 2024-04-30 NOTE — CONSULT LETTER
[Dear  ___] : Dear  [unfilled], [Consult Letter:] : I had the pleasure of evaluating your patient, [unfilled]. [FreeTextEntry1] : The patient was seen in hand consultation today. A copy of my office note is enclosed for your review with the patient's knowledge and consent.  Sincerely,  Bon Dela Cruz MD Chief, Hand Surgery Residency  ([phone]) Department of Orthopaedic Surgery  [facility] Professor of Orthopaedic Surgery Edmund WEBBER at [facility]

## 2024-04-30 NOTE — ASSESSMENT
[FreeTextEntry1] : The patient has left radial wrist pain.  Patient is doing physical therapy to strengthen right shoulder in advance of reverse right total shoulder replacement scheduled for 6/5/2024.  After discussing the nature of screening and muscle strengthening it is likely that the patient has developed de Quervain's tendinitis on the left because of the various exercises that he has been doing.  This explains the left radial wrist pain. After reviewing treatment alternatives risks and complications I recommended a left wrist first extensor compartment cortisone injection as a diagnostic test and therapeutic trial.  This was accepted by patient.  Injection without complication.  Patient should avoid strengthening exercises and overuse of the left wrist  for at least 2 weeks.  If left wrist pain subsides, then no further treatment needed.  If left wrist pain continues, patient should return for reassessment. Patient has no other notable active hand problems that requires treatment. Prognosis uncertain. Return as needed. Acceptance and understanding

## 2024-05-01 ENCOUNTER — RX RENEWAL (OUTPATIENT)
Age: 69
End: 2024-05-01

## 2024-05-01 RX ORDER — ROSUVASTATIN CALCIUM 40 MG/1
40 TABLET, FILM COATED ORAL
Qty: 90 | Refills: 3 | Status: ACTIVE | COMMUNITY
Start: 2021-06-04 | End: 1900-01-01

## 2024-05-02 ENCOUNTER — LABORATORY RESULT (OUTPATIENT)
Age: 69
End: 2024-05-02

## 2024-05-02 LAB
ALBUMIN SERPL ELPH-MCNC: 4.4 G/DL
ALP BLD-CCNC: 94 U/L
ALT SERPL-CCNC: 21 U/L
ANION GAP SERPL CALC-SCNC: 13 MMOL/L
APPEARANCE: CLEAR
AST SERPL-CCNC: 21 U/L
BACTERIA: NEGATIVE /HPF
BASOPHILS # BLD AUTO: 0.07 K/UL
BASOPHILS NFR BLD AUTO: 0.9 %
BILIRUB SERPL-MCNC: 0.4 MG/DL
BILIRUBIN URINE: NEGATIVE
BLOOD URINE: NEGATIVE
BUN SERPL-MCNC: 14 MG/DL
CALCIUM SERPL-MCNC: 9.7 MG/DL
CAST: 1 /LPF
CHLORIDE SERPL-SCNC: 107 MMOL/L
CHOLEST SERPL-MCNC: 129 MG/DL
CO2 SERPL-SCNC: 21 MMOL/L
COLOR: YELLOW
CREAT SERPL-MCNC: 0.86 MG/DL
EGFR: 95 ML/MIN/1.73M2
EOSINOPHIL # BLD AUTO: 0.14 K/UL
EOSINOPHIL NFR BLD AUTO: 1.8 %
EPITHELIAL CELLS: 1 /HPF
ESTIMATED AVERAGE GLUCOSE: 123 MG/DL
GLUCOSE QUALITATIVE U: NEGATIVE MG/DL
GLUCOSE SERPL-MCNC: 86 MG/DL
GLUCOSE SERPL-MCNC: 92 MG/DL
HBA1C MFR BLD HPLC: 5.9 %
HCT VFR BLD CALC: 43.8 %
HDLC SERPL-MCNC: 44 MG/DL
HGB BLD-MCNC: 13.9 G/DL
IMM GRANULOCYTES NFR BLD AUTO: 0.3 %
KETONES URINE: NEGATIVE MG/DL
LDLC SERPL CALC-MCNC: 60 MG/DL
LEUKOCYTE ESTERASE URINE: NEGATIVE
LYMPHOCYTES # BLD AUTO: 1.71 K/UL
LYMPHOCYTES NFR BLD AUTO: 21.8 %
MAN DIFF?: NORMAL
MCHC RBC-ENTMCNC: 28.7 PG
MCHC RBC-ENTMCNC: 31.7 GM/DL
MCV RBC AUTO: 90.3 FL
MICROSCOPIC-UA: NORMAL
MONOCYTES # BLD AUTO: 0.76 K/UL
MONOCYTES NFR BLD AUTO: 9.7 %
NEUTROPHILS # BLD AUTO: 5.13 K/UL
NEUTROPHILS NFR BLD AUTO: 65.5 %
NITRITE URINE: NEGATIVE
NONHDLC SERPL-MCNC: 85 MG/DL
PH URINE: 7.5
PLATELET # BLD AUTO: 234 K/UL
POTASSIUM SERPL-SCNC: 4.7 MMOL/L
PROT SERPL-MCNC: 6.8 G/DL
PROTEIN URINE: NEGATIVE MG/DL
PSA SERPL-MCNC: 0.82 NG/ML
RBC # BLD: 4.85 M/UL
RBC # FLD: 14.7 %
RED BLOOD CELLS URINE: 1 /HPF
REVIEW: NORMAL
SODIUM SERPL-SCNC: 140 MMOL/L
SPECIFIC GRAVITY URINE: 1.01
SPERM-LIKE CELLS: PRESENT
TRIGL SERPL-MCNC: 146 MG/DL
UROBILINOGEN URINE: 0.2 MG/DL
WBC # FLD AUTO: 7.83 K/UL
WHITE BLOOD CELLS URINE: 1 /HPF

## 2024-05-17 ENCOUNTER — NON-APPOINTMENT (OUTPATIENT)
Age: 69
End: 2024-05-17

## 2024-05-17 ENCOUNTER — APPOINTMENT (OUTPATIENT)
Dept: CARDIOLOGY | Facility: CLINIC | Age: 69
End: 2024-05-17
Payer: MEDICARE

## 2024-05-17 VITALS
HEIGHT: 69 IN | BODY MASS INDEX: 31.4 KG/M2 | SYSTOLIC BLOOD PRESSURE: 123 MMHG | DIASTOLIC BLOOD PRESSURE: 77 MMHG | WEIGHT: 212 LBS | OXYGEN SATURATION: 96 % | HEART RATE: 77 BPM

## 2024-05-17 DIAGNOSIS — I34.0 NONRHEUMATIC MITRAL (VALVE) INSUFFICIENCY: ICD-10-CM

## 2024-05-17 PROCEDURE — 99214 OFFICE O/P EST MOD 30 MIN: CPT

## 2024-05-17 PROCEDURE — 93000 ELECTROCARDIOGRAM COMPLETE: CPT
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
## 2024-05-17 NOTE — DISCUSSION/SUMMARY
[With Me] : with me [___ Month(s)] : in [unfilled] month(s) [FreeTextEntry1] : Mr. Burroughs is a 68 year old male with HLD, here for follow up.  In 2021, he had a cardiac catheterization demonstrating significant LAD and RCA disease.  He is now status post PCI to his LAD, and POBA to his LCx with an excellent result, at this time. At the end of 2022, a repeat echocardiogram demonstrated a decline in his LV function, and I sent him back for cardiac catheterization.  This again re- demonstrated severe left circumflex disease, and he is now status post stent placement.  He is doing well today. His LV function has slightly improved to the 40% range, and his revascularization has been completed with PCI to the  of his RCA.   He will remain on aspirin, Plavix, and Crestor/Zetia.  His most recent LDL on record was 60.  He will continue Entresto. He has been off the beta-blocker because of significant psoriasis.  He will try to stay active, eat right, and lose weight.    There is no cardiac contraindication to proceeding with shoulder surgery. He will stop his Plavix 5-7 days prior with the plan to discontinue the medication after. He will remain on ASA long term.  I will see him again in 3 months. [EKG obtained to assist in diagnosis and management of assessed problem(s)] : EKG obtained to assist in diagnosis and management of assessed problem(s)

## 2024-05-24 ENCOUNTER — APPOINTMENT (OUTPATIENT)
Dept: INTERNAL MEDICINE | Facility: CLINIC | Age: 69
End: 2024-05-24
Payer: MEDICARE

## 2024-05-24 VITALS
HEART RATE: 95 BPM | WEIGHT: 215 LBS | BODY MASS INDEX: 31.84 KG/M2 | TEMPERATURE: 99.2 F | HEIGHT: 69 IN | DIASTOLIC BLOOD PRESSURE: 80 MMHG | OXYGEN SATURATION: 95 % | SYSTOLIC BLOOD PRESSURE: 110 MMHG

## 2024-05-24 VITALS — SYSTOLIC BLOOD PRESSURE: 120 MMHG | DIASTOLIC BLOOD PRESSURE: 70 MMHG

## 2024-05-24 DIAGNOSIS — I25.10 ATHEROSCLEROTIC HEART DISEASE OF NATIVE CORONARY ARTERY W/OUT ANGINA PECTORIS: ICD-10-CM

## 2024-05-24 DIAGNOSIS — Z01.818 ENCOUNTER FOR OTHER PREPROCEDURAL EXAMINATION: ICD-10-CM

## 2024-05-24 DIAGNOSIS — R73.03 PREDIABETES.: ICD-10-CM

## 2024-05-24 DIAGNOSIS — E78.5 HYPERLIPIDEMIA, UNSPECIFIED: ICD-10-CM

## 2024-05-24 DIAGNOSIS — I10 ESSENTIAL (PRIMARY) HYPERTENSION: ICD-10-CM

## 2024-05-24 DIAGNOSIS — I25.5 ISCHEMIC CARDIOMYOPATHY: ICD-10-CM

## 2024-05-24 PROCEDURE — 99214 OFFICE O/P EST MOD 30 MIN: CPT

## 2024-05-24 NOTE — PHYSICAL EXAM
[Normal Sclera/Conjunctiva] : normal sclera/conjunctiva [PERRL] : pupils equal round and reactive to light [EOMI] : extraocular movements intact [Normal Outer Ear/Nose] : the outer ears and nose were normal in appearance [Normal Oropharynx] : the oropharynx was normal [No Lymphadenopathy] : no lymphadenopathy [Thyroid Normal, No Nodules] : the thyroid was normal and there were no nodules present [No Carotid Bruits] : no carotid bruits [No Abdominal Bruit] : a ~M bruit was not heard ~T in the abdomen [No Varicosities] : no varicosities [No Edema] : there was no peripheral edema [No Palpable Aorta] : no palpable aorta [Normal Appearance] : normal in appearance [Normal] : soft, non-tender, non-distended, no masses palpated, no HSM and normal bowel sounds [Normal Supraclavicular Nodes] : no supraclavicular lymphadenopathy [Normal Posterior Cervical Nodes] : no posterior cervical lymphadenopathy [Normal Anterior Cervical Nodes] : no anterior cervical lymphadenopathy [No CVA Tenderness] : no CVA  tenderness [No Spinal Tenderness] : no spinal tenderness [No Rash] : no rash [Coordination Grossly Intact] : coordination grossly intact [No Focal Deficits] : no focal deficits [Normal Gait] : normal gait [Normal Affect] : the affect was normal [Normal Insight/Judgement] : insight and judgment were intact

## 2024-05-24 NOTE — ASSESSMENT
[Patient Optimized for Surgery] : Patient optimized for surgery [No Further Testing Recommended] : no further testing recommended [Modify medications prior to procedure] : Modify medications prior to procedure [As per surgery] : as per surgery [FreeTextEntry4] : Patient in most optimal medical condition for proposed procedure He was advised to hold the Plavix for 5 to 7 days prior to the procedure and as per cardiology can be discontinued following the procedure He was advised to take his Entresto with a small sip of water on the morning of the procedure [FreeTextEntry7] : See recommendations above

## 2024-05-24 NOTE — HISTORY OF PRESENT ILLNESS
[Coronary Artery Disease] : coronary artery disease [No Pertinent Pulmonary History] : no history of asthma, COPD, sleep apnea, or smoking [No Adverse Anesthesia Reaction] : no adverse anesthesia reaction in self or family member [(Patient denies any chest pain, claudication, dyspnea on exertion, orthopnea, palpitations or syncope)] : Patient denies any chest pain, claudication, dyspnea on exertion, orthopnea, palpitations or syncope [Moderate (4-6 METs)] : Moderate (4-6 METs) [Aortic Stenosis] : no aortic stenosis [Atrial Fibrillation] : no atrial fibrillation [Recent Myocardial Infarction] : no recent myocardial infarction [Implantable Device/Pacemaker] : no implantable device/pacemaker [Chronic Anticoagulation] : no chronic anticoagulation [Chronic Kidney Disease] : no chronic kidney disease [Diabetes] : no diabetes [FreeTextEntry1] : Elective right shoulder surgery [FreeTextEntry2] : 6/5/2024 [FreeTextEntry3] : Dr. Jack Morocho [FreeTextEntry4] : Patient with history of hypertension, hyperlipidemia, prediabetes, coronary artery disease and left ventricular dysfunction presents to office for preoperative evaluation. Patient feels well overall and denies any exertional chest pain, shortness of breath, palpitations. Patient underwent cardiac and pulmonary clearance.

## 2024-05-26 ENCOUNTER — RX RENEWAL (OUTPATIENT)
Age: 69
End: 2024-05-26

## 2024-05-26 RX ORDER — EZETIMIBE 10 MG/1
10 TABLET ORAL DAILY
Qty: 90 | Refills: 0 | Status: ACTIVE | COMMUNITY
Start: 2023-06-30 | End: 1900-01-01

## 2024-06-13 ENCOUNTER — NON-APPOINTMENT (OUTPATIENT)
Age: 69
End: 2024-06-13

## 2024-07-01 ENCOUNTER — RX RENEWAL (OUTPATIENT)
Age: 69
End: 2024-07-01

## 2024-07-08 ENCOUNTER — NON-APPOINTMENT (OUTPATIENT)
Age: 69
End: 2024-07-08

## 2024-07-11 ENCOUNTER — APPOINTMENT (OUTPATIENT)
Dept: INTERNAL MEDICINE | Facility: CLINIC | Age: 69
End: 2024-07-11
Payer: MEDICARE

## 2024-07-11 VITALS
DIASTOLIC BLOOD PRESSURE: 64 MMHG | SYSTOLIC BLOOD PRESSURE: 114 MMHG | WEIGHT: 211 LBS | OXYGEN SATURATION: 96 % | HEART RATE: 85 BPM | BODY MASS INDEX: 31.25 KG/M2 | RESPIRATION RATE: 12 BRPM | HEIGHT: 69 IN | TEMPERATURE: 99.3 F

## 2024-07-11 DIAGNOSIS — I25.5 ISCHEMIC CARDIOMYOPATHY: ICD-10-CM

## 2024-07-11 DIAGNOSIS — I25.10 ATHEROSCLEROTIC HEART DISEASE OF NATIVE CORONARY ARTERY W/OUT ANGINA PECTORIS: ICD-10-CM

## 2024-07-11 DIAGNOSIS — G47.33 OBSTRUCTIVE SLEEP APNEA (ADULT) (PEDIATRIC): ICD-10-CM

## 2024-07-11 DIAGNOSIS — I10 ESSENTIAL (PRIMARY) HYPERTENSION: ICD-10-CM

## 2024-07-11 DIAGNOSIS — R73.03 PREDIABETES.: ICD-10-CM

## 2024-07-11 DIAGNOSIS — M70.61 TROCHANTERIC BURSITIS, RIGHT HIP: ICD-10-CM

## 2024-07-11 PROCEDURE — 99214 OFFICE O/P EST MOD 30 MIN: CPT

## 2024-09-03 ENCOUNTER — RX RENEWAL (OUTPATIENT)
Age: 69
End: 2024-09-03

## 2024-09-26 ENCOUNTER — NON-APPOINTMENT (OUTPATIENT)
Age: 69
End: 2024-09-26

## 2024-09-26 ENCOUNTER — APPOINTMENT (OUTPATIENT)
Dept: CARDIOLOGY | Facility: CLINIC | Age: 69
End: 2024-09-26
Payer: MEDICARE

## 2024-09-26 VITALS
WEIGHT: 212 LBS | SYSTOLIC BLOOD PRESSURE: 136 MMHG | OXYGEN SATURATION: 98 % | BODY MASS INDEX: 31.4 KG/M2 | HEIGHT: 69 IN | HEART RATE: 71 BPM | DIASTOLIC BLOOD PRESSURE: 81 MMHG

## 2024-09-26 VITALS — DIASTOLIC BLOOD PRESSURE: 76 MMHG | SYSTOLIC BLOOD PRESSURE: 130 MMHG

## 2024-09-26 DIAGNOSIS — R94.31 ABNORMAL ELECTROCARDIOGRAM [ECG] [EKG]: ICD-10-CM

## 2024-09-26 DIAGNOSIS — I25.10 ATHEROSCLEROTIC HEART DISEASE OF NATIVE CORONARY ARTERY W/OUT ANGINA PECTORIS: ICD-10-CM

## 2024-09-26 DIAGNOSIS — I34.0 NONRHEUMATIC MITRAL (VALVE) INSUFFICIENCY: ICD-10-CM

## 2024-09-26 PROCEDURE — 93000 ELECTROCARDIOGRAM COMPLETE: CPT

## 2024-09-26 PROCEDURE — 99214 OFFICE O/P EST MOD 30 MIN: CPT
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
## 2024-09-26 NOTE — DISCUSSION/SUMMARY
[With Me] : with me [___ Month(s)] : in [unfilled] month(s) [FreeTextEntry1] : Mr. Burroughs is a 69 year old male with HLD, here for follow up.  In 2021, he had a cardiac catheterization demonstrating significant LAD and RCA disease.  He is now status post PCI to his LAD, and POBA to his LCx with an excellent result, at this time. At the end of 2022, a repeat echocardiogram demonstrated a decline in his LV function, and I sent him back for cardiac catheterization.  This again re- demonstrated severe left circumflex disease, and he is now status post stent placement.  He is doing well today. His LV function has slightly improved to the 40% range, and his revascularization has been completed with PCI to the  of his RCA.   He will remain on aspirin (and off plavix), and Crestor/Zetia.  His most recent LDL on record was 60.  He will continue Entresto. He has been off the beta-blocker because of significant psoriasis.  He will try to stay active, eat right, and lose weight.     I will see him again in 3 months. We will repeat an echocardiogram and a stress test in January 2025. [EKG obtained to assist in diagnosis and management of assessed problem(s)] : EKG obtained to assist in diagnosis and management of assessed problem(s)

## 2024-10-01 ENCOUNTER — RX RENEWAL (OUTPATIENT)
Age: 69
End: 2024-10-01

## 2024-10-25 ENCOUNTER — APPOINTMENT (OUTPATIENT)
Dept: INTERNAL MEDICINE | Facility: CLINIC | Age: 69
End: 2024-10-25
Payer: MEDICARE

## 2024-10-25 VITALS
SYSTOLIC BLOOD PRESSURE: 124 MMHG | BODY MASS INDEX: 31.55 KG/M2 | WEIGHT: 213 LBS | RESPIRATION RATE: 12 BRPM | HEIGHT: 69 IN | HEART RATE: 89 BPM | DIASTOLIC BLOOD PRESSURE: 83 MMHG | OXYGEN SATURATION: 98 %

## 2024-10-25 DIAGNOSIS — I25.5 ISCHEMIC CARDIOMYOPATHY: ICD-10-CM

## 2024-10-25 DIAGNOSIS — I25.10 ATHEROSCLEROTIC HEART DISEASE OF NATIVE CORONARY ARTERY W/OUT ANGINA PECTORIS: ICD-10-CM

## 2024-10-25 DIAGNOSIS — I10 ESSENTIAL (PRIMARY) HYPERTENSION: ICD-10-CM

## 2024-10-25 DIAGNOSIS — M54.50 LOW BACK PAIN, UNSPECIFIED: ICD-10-CM

## 2024-10-25 PROCEDURE — 99214 OFFICE O/P EST MOD 30 MIN: CPT

## 2024-10-25 PROCEDURE — G2211 COMPLEX E/M VISIT ADD ON: CPT

## 2024-10-26 RX ORDER — CARISOPRODOL 250 MG/1
250 TABLET ORAL DAILY
Qty: 10 | Refills: 0 | Status: ACTIVE | COMMUNITY
Start: 2024-10-25 | End: 1900-01-01

## 2024-12-06 ENCOUNTER — RX RENEWAL (OUTPATIENT)
Age: 69
End: 2024-12-06

## 2024-12-26 ENCOUNTER — RX RENEWAL (OUTPATIENT)
Age: 69
End: 2024-12-26

## 2025-01-03 ENCOUNTER — NON-APPOINTMENT (OUTPATIENT)
Age: 70
End: 2025-01-03

## 2025-01-06 ENCOUNTER — MED ADMIN CHARGE (OUTPATIENT)
Age: 70
End: 2025-01-06

## 2025-01-06 ENCOUNTER — APPOINTMENT (OUTPATIENT)
Dept: CARDIOLOGY | Facility: CLINIC | Age: 70
End: 2025-01-06
Payer: MEDICARE

## 2025-01-06 PROCEDURE — 78452 HT MUSCLE IMAGE SPECT MULT: CPT

## 2025-01-06 PROCEDURE — 93306 TTE W/DOPPLER COMPLETE: CPT

## 2025-01-06 PROCEDURE — 93015 CV STRESS TEST SUPVJ I&R: CPT

## 2025-01-06 PROCEDURE — A9500: CPT

## 2025-01-06 RX ORDER — PERFLUTREN 6.52 MG/ML
6.52 INJECTION, SUSPENSION INTRAVENOUS
Qty: 1 | Refills: 0 | Status: COMPLETED | OUTPATIENT
Start: 2025-01-06

## 2025-01-06 RX ADMIN — PERFLUTREN MG/ML: 6.52 INJECTION, SUSPENSION INTRAVENOUS at 00:00

## 2025-02-18 ENCOUNTER — NON-APPOINTMENT (OUTPATIENT)
Age: 70
End: 2025-02-18

## 2025-04-17 ENCOUNTER — NON-APPOINTMENT (OUTPATIENT)
Age: 70
End: 2025-04-17

## 2025-04-29 ENCOUNTER — NON-APPOINTMENT (OUTPATIENT)
Age: 70
End: 2025-04-29

## 2025-05-01 ENCOUNTER — NON-APPOINTMENT (OUTPATIENT)
Age: 70
End: 2025-05-01

## 2025-05-01 ENCOUNTER — APPOINTMENT (OUTPATIENT)
Dept: INTERNAL MEDICINE | Facility: CLINIC | Age: 70
End: 2025-05-01
Payer: MEDICARE

## 2025-05-01 VITALS
RESPIRATION RATE: 12 BRPM | HEART RATE: 94 BPM | OXYGEN SATURATION: 96 % | HEIGHT: 69 IN | SYSTOLIC BLOOD PRESSURE: 139 MMHG | WEIGHT: 220 LBS | DIASTOLIC BLOOD PRESSURE: 68 MMHG | BODY MASS INDEX: 32.58 KG/M2

## 2025-05-01 VITALS — SYSTOLIC BLOOD PRESSURE: 120 MMHG | DIASTOLIC BLOOD PRESSURE: 80 MMHG

## 2025-05-01 DIAGNOSIS — I25.10 ATHEROSCLEROTIC HEART DISEASE OF NATIVE CORONARY ARTERY W/OUT ANGINA PECTORIS: ICD-10-CM

## 2025-05-01 DIAGNOSIS — Z00.00 ENCOUNTER FOR GENERAL ADULT MEDICAL EXAMINATION W/OUT ABNORMAL FINDINGS: ICD-10-CM

## 2025-05-01 DIAGNOSIS — E78.5 HYPERLIPIDEMIA, UNSPECIFIED: ICD-10-CM

## 2025-05-01 DIAGNOSIS — I25.5 ISCHEMIC CARDIOMYOPATHY: ICD-10-CM

## 2025-05-01 DIAGNOSIS — I10 ESSENTIAL (PRIMARY) HYPERTENSION: ICD-10-CM

## 2025-05-01 PROCEDURE — 36415 COLL VENOUS BLD VENIPUNCTURE: CPT

## 2025-05-01 PROCEDURE — G0439: CPT

## 2025-05-01 PROCEDURE — 99213 OFFICE O/P EST LOW 20 MIN: CPT | Mod: 25

## 2025-05-01 PROCEDURE — G0444 DEPRESSION SCREEN ANNUAL: CPT | Mod: 59

## 2025-05-01 PROCEDURE — 93000 ELECTROCARDIOGRAM COMPLETE: CPT | Mod: 59

## 2025-05-05 ENCOUNTER — LABORATORY RESULT (OUTPATIENT)
Age: 70
End: 2025-05-05

## 2025-05-06 DIAGNOSIS — D64.9 ANEMIA, UNSPECIFIED: ICD-10-CM

## 2025-05-06 LAB — HEMOCCULT STL QL IA: NEGATIVE

## 2025-05-08 LAB
FERRITIN SERPL-MCNC: 21 NG/ML
FOLATE SERPL-MCNC: 11.9 NG/ML
IRON SATN MFR SERPL: 13 %
IRON SERPL-MCNC: 46 UG/DL
TIBC SERPL-MCNC: 355 UG/DL
UIBC SERPL-MCNC: 309 UG/DL
VIT B12 SERPL-MCNC: 459 PG/ML

## 2025-06-02 ENCOUNTER — RX RENEWAL (OUTPATIENT)
Age: 70
End: 2025-06-02

## 2025-07-01 ENCOUNTER — RX RENEWAL (OUTPATIENT)
Age: 70
End: 2025-07-01

## 2025-07-09 ENCOUNTER — NON-APPOINTMENT (OUTPATIENT)
Age: 70
End: 2025-07-09

## 2025-09-01 ENCOUNTER — RX RENEWAL (OUTPATIENT)
Age: 70
End: 2025-09-01